# Patient Record
Sex: MALE | Race: WHITE | HISPANIC OR LATINO | Employment: OTHER | URBAN - METROPOLITAN AREA
[De-identification: names, ages, dates, MRNs, and addresses within clinical notes are randomized per-mention and may not be internally consistent; named-entity substitution may affect disease eponyms.]

---

## 2017-01-26 ENCOUNTER — TELEPHONE (OUTPATIENT)
Dept: GASTROENTEROLOGY | Facility: OUTPATIENT CENTER | Age: 42
End: 2017-01-26

## 2017-01-26 DIAGNOSIS — K62.5 RECTAL BLEEDING: Primary | ICD-10-CM

## 2017-01-26 NOTE — TELEPHONE ENCOUNTER
Patient taking any blood thinners ? no    Heart disease ? denies    Lung disease ? Mild asthma. Advised pt. To bring inhaler      Sleep apnea ? denies    Diabetic ? denies    Kidney disease ? denies    Dialysis ? n/a    Electronic implanted medical devices ? denies    Are you taking any narcotic pain medication ? no  What is your daily dosage ?    PTSD ? n/a    Prep instructions reviewed with patient ? Instructions,  MAC sedation plan reviewed.  policy reviewed and reinforced. Advised exam would not be done if pt. Did not have a . Also advised pt. To have someone stay with him post exam.    Ph[facility] : [facility]    Indication for procedure : rectal bleeding    Referring provider : Nakia Gabriel

## 2017-02-02 ENCOUNTER — TRANSFERRED RECORDS (OUTPATIENT)
Dept: HEALTH INFORMATION MANAGEMENT | Facility: CLINIC | Age: 42
End: 2017-02-02

## 2017-02-07 ENCOUNTER — OFFICE VISIT (OUTPATIENT)
Dept: INTERNAL MEDICINE | Facility: CLINIC | Age: 42
End: 2017-02-07

## 2017-02-07 VITALS
WEIGHT: 158 LBS | DIASTOLIC BLOOD PRESSURE: 87 MMHG | SYSTOLIC BLOOD PRESSURE: 137 MMHG | BODY MASS INDEX: 23.32 KG/M2 | HEART RATE: 86 BPM | RESPIRATION RATE: 16 BRPM

## 2017-02-07 DIAGNOSIS — M54.42 ACUTE LEFT-SIDED LOW BACK PAIN WITH LEFT-SIDED SCIATICA: Primary | ICD-10-CM

## 2017-02-07 DIAGNOSIS — Z11.3 SCREEN FOR STD (SEXUALLY TRANSMITTED DISEASE): ICD-10-CM

## 2017-02-07 LAB — HIV 1+2 AB+HIV1 P24 AG SERPL QL IA: NORMAL

## 2017-02-07 PROCEDURE — 87591 N.GONORRHOEAE DNA AMP PROB: CPT | Performed by: STUDENT IN AN ORGANIZED HEALTH CARE EDUCATION/TRAINING PROGRAM

## 2017-02-07 PROCEDURE — 87389 HIV-1 AG W/HIV-1&-2 AB AG IA: CPT | Performed by: STUDENT IN AN ORGANIZED HEALTH CARE EDUCATION/TRAINING PROGRAM

## 2017-02-07 PROCEDURE — 87491 CHLMYD TRACH DNA AMP PROBE: CPT | Performed by: STUDENT IN AN ORGANIZED HEALTH CARE EDUCATION/TRAINING PROGRAM

## 2017-02-07 RX ORDER — CYCLOBENZAPRINE HCL 5 MG
10 TABLET ORAL 3 TIMES DAILY PRN
Qty: 42 TABLET | Refills: 0 | Status: SHIPPED | OUTPATIENT
Start: 2017-02-07 | End: 2019-03-05

## 2017-02-07 ASSESSMENT — PAIN SCALES - GENERAL: PAINLEVEL: MILD PAIN (2)

## 2017-02-07 NOTE — PROGRESS NOTES
PRIMARY CARE CLINIC    Resident Clinic Note        Visit Date: February 7, 2017    César Conn  MRN: [id number]  YOB: 1975    HPI:  César Conn is a 41 year old male with a h/o GERD, asthma, hemorrhoids s/p banding, and EtOH abuse (sober since 2010) that presents for follow up about a recent colonoscopy.      The patient was last seen in 11/2015 and referred to colorectal surgery for hemorrhoids that failed conservative therapy.  He subsequently had banding and due to ongoing bleeding also had a colonoscopy that was unremarkable.  Since his last banding he has been doing well.      Acutely, 5-6 weeks ago he slipped on ice, falling onto his left buttock/flank.  Initially he manages with tylenol and stretching.  Symptoms have predominately been tightness and radicular symptoms radiating down the left leg to the knee.  Radicular symptoms have significantly improved and no longer occur.  Still having morning tightness.  No bowel or bladder incontinence.  No LE weakness.           ROS:  Reviewed and negative other than stated in HPI    Past medical history reviewed.    Past Medical History   Diagnosis Date     Asthma      GERD (gastroesophageal reflux disease)      History of ETOH abuse      Shoulder separation      bilateral ('99, '10)     Depression      Anxiety        No Known Allergies    Past Surgical History   Procedure Laterality Date     Shoulder separation repair Bilateral        Family History   Problem Relation Age of Onset     DIABETES Mother        Social History     Social History     Marital Status: Single     Spouse Name: N/A     Number of Children: N/A     Years of Education: N/A     Occupational History     Not on file.     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       Vitals:  /87 mmHg  Pulse 86  Resp 16  Wt 71.668 kg (158 lb)    Physical Exam:  General: NAD  HEENT: MMM, EOM intact  Resp: Non-labored  MSK: No vetebral column tenderness, mils bilaterally tenderness over the SI joints, full ROM or the low back  Extremities: WWP  Neuro: AAOx3, no lateralizing symptoms or focal neurologic deficits    A/P:  César was seen today for results.    Diagnoses and all orders for this visit:    Acute left-sided low back pain with left-sided sciatica  Mechanical muscular strain 2/2 to fall.  Improving overall.  No alarm symptoms.  Will continue conservative management.   -     cyclobenzaprine (FLEXERIL) 5 MG tablet; Take 2 tablets (10 mg) by mouth 3 times daily as needed for muscle spasms  -     PHYSICAL THERAPY REFERRAL    Screen for STD (sexually transmitted disease)  -     HIV Antigen Antibody Combo; Future  -     Neisseria gonorrhoeae PCR - Order printing and Urine specimen collection in lab  -     Chlamydia trachomatis PCR - Order printing and Urine specimen collection in lab    Hemorrhoids  Currently doing well.  Follows with colorectal surgery.     Health Maintenance: None  Follow-up: 1 year or PRN    Patient seen and discussed with Dr. Noel.      Asad Dhillon MD, MPH  PGY-3, Internal Medicine   [phone]       Pt was seen and examined with Dr. Dhillon.  I agree with his documentation as noted above.    My additional comments: None    Ever  William Noel

## 2017-02-07 NOTE — MR AVS SNAPSHOT
After Visit Summary   2/7/2017    César Conn    MRN: [id number]           Patient Information     Date Of Birth          1975        Visit Information        Provider Department      2/7/2017 1:15 PM César Dhillon MD [facility]        Today's Diagnoses     Acute left-sided low back pain with left-sided sciatica    -  1     Screen for STD (sexually transmitted disease)           Care Instructions    Primary Care Center Phone Number [phone]  Primary Care Center Medication Refill Request Information:  * Please contact your pharmacy regarding ANY request for medication refills.  ** [facility] Prescription Fax = [phone]  * Please allow 3 business days for routine medication refills.  * Please allow 5 business days for controlled substance medication refills.     Primary Care Center Test Result notification information:  *You will be notified with in 7-10 days of your appointment day regarding the results of your test.  If you are on MyChart you will be notified as soon as the provider has reviewed the results and signed off on them.        HOW TO QUIT SMOKING  Smoking is one of the hardest habits to break. About half of all those who have ever smoked have been able to quit, and most of those (about 70%) who still smoke want to quit. Here are some of the best ways to stop smoking.     KEEP TRYING:  It takes most smokers about 8 tries before they are finally able to fully quit. So, the more often you try and fail, the better your chance of quitting the next time! So, don't give up!    GO COLD TURKEY:  Most ex-smokers quit cold turkey. Trying to cut back gradually doesn't seem to work as well, perhaps because it continues the smoking habit. Also, it is possible to fool yourself by inhaling more while smoking fewer cigarettes. This results in the same amount of nicotine in your body!    GET SUPPORT:  Support programs can make an important difference, especially for the  heavy smoker. These groups offer lectures, methods to change your behavior and peer support. Call the free national Quitline for more information. 800-QUIT-NOW (196-817-2182). Low-cost or free programs are offered by many hospitals, local chapters of the American Lung Association (391-276-3789) and the American Cancer Society (169-408-4797). Support at home is important too. Non-smokers can help by offering praise and encouragement. If the smoker fails to quit, encourage them to try again!  OVER-THE-COUNTER MEDICINES:  For those who can't quit on their own, Nicotine Replacement Therapy (NRT) may make quitting much easier. Certain aids such as the nicotine patch, gum and lozenge are available without a prescription. However, it is best to use these under the guidance of your doctor. The skin patch provides a steady supply of nicotine to the body. Nicotine gum and lozenge gives temporary bursts of low levels of nicotine. Both methods take the edge off the craving for cigarettes. WARNING: If you feel symptoms of nicotine overdose, such as nausea, vomiting, dizziness, weakness, or fast heartbeat, stop using these and see your doctor.    PRESCRIPTION MEDICINES:  After evaluating your smoking patterns and prior attempts at quitting, your doctor may offer a prescription medicine such as bupropion (Zyban, Wellbutrin), varenicline (Chantix, Champix), a niocotine inhaler or nasal spray. Each has its unique advantage and side effects which your doctor can review with you.    HEALTH BENEFITS OF QUITTING:  The benefits of quitting start right away and keep improving the longer you go without smokin minutes: blood pressure and pulse return to normal    8 hours: oxygen levels return to normal    2 days: ability to smell and taste begins to improve as damaged nerves start to regrow    2-3 weeks: circulation and lung function improves    1-9 months: decreased cough, congestion and shortness of breath; less tired    1 year:  risk of heart attack decreases by half    5 years: risk of lung cancer decreases by half; risk of stroke becomes the same as a non-smoker  For information about how to quit smoking, visit the following links:    National Cancer [place] ,   Clearing the Air, Quit Smoking Today   - an online booklet. http://www.smokefree.gov/pubs/clearing_the_air.pdf    Smokefree.gov http://smokefree.gov/    QuitNet http://www.quitnet.com/    9338-1104 Momo Maldonado, [address] [place] [address], [place] [address]. All rights reserved. This information is not intended as a substitute for professional medical care. Always follow your healthcare professional's instructions.     Flexeril for muscle relaxation  Physical therapy  Tylenol for pain (up to 3g per day)  NSAIDs        Follow-ups after your visit        Additional Services     PHYSICAL THERAPY REFERRAL       *This therapy referral will be filtered to a centralized scheduling office at [facility] and the patient will receive a call to schedule an appointment at a [facility] location most convenient for them. *     [facility] provides Physical Therapy evaluation and treatment and many specialty services across the [facility] system.  If requesting a specialty program, please choose from the list below.    If you have not heard from the scheduling office within 2 business days, please call [phone] for all locations, with the exception of [place], please call [phone].  Treatment: Evaluation & Treatment  Special Instructions/Modalities:   Special Programs: None    Please be aware that coverage of these services is subject to the terms and limitations of your health insurance plan.  Call member services at your health plan with any benefit or coverage questions.      **Note to Provider:  If you are referring outside of [facility] for the therapy appointment, please list the name of the location in the  special instructions  above, print the  referral and give to the patient to schedule the appointment.                  Future tests that were ordered for you today     Open Future Orders        Priority Expected Expires Ordered    HIV Antigen Antibody Combo Routine  2018            Who to contact     Please call your clinic at [phone] to:    Ask questions about your health    Make or cancel appointments    Discuss your medicines    Learn about your test results    Speak to your doctor   If you have compliments or concerns about an experience at your clinic, or if you wish to file a complaint, please contact [facility] Patient Relations at [phone] or email us at [email or web address]         Additional Information About Your Visit        [employer] website Information     Connequityt is an electronic gateway that provides easy, online access to your medical records. With LogoGrab, you can request a clinic appointment, read your test results, renew a prescription or communicate with your care team.     To sign up for LogoGrab visit the website at [email or web address]   You will be asked to enter the access code listed below, as well as some personal information. Please follow the directions to create your username and password.     Your access code is: [email or web address]  Expires: 2017  6:30 AM     Your access code will  in 90 days. If you need help or a new code, please contact your [facility] or call [phone] for assistance.        Care EveryWhere ID     This is your Care EveryWhere ID. This could be used by other organizations to access your [facility] medical records  [id number]        Your Vitals Were     Pulse Respirations                86 16           Blood Pressure from Last 3 Encounters:   17 137/87   16 146/94   16 125/83    Weight from Last 3 Encounters:   17 71.668 kg (158 lb)   16 73.619 kg (162 lb 4.8 oz)   12/02/15 75.252  kg (165 lb 14.4 oz)              We Performed the Following     Chlamydia trachomatis PCR - Order printing and Urine specimen collection in lab     Neisseria gonorrhoeae PCR - Order printing and Urine specimen collection in lab     PHYSICAL THERAPY REFERRAL          Today's Medication Changes          These changes are accurate as of: 2/7/17  1:48 PM.  If you have any questions, ask your nurse or doctor.               Start taking these medicines.        Dose/Directions    cyclobenzaprine 5 MG tablet   Commonly known as:  FLEXERIL   Used for:  Acute left-sided low back pain with left-sided sciatica        Dose:  10 mg   Take 2 tablets (10 mg) by mouth 3 times daily as needed for muscle spasms   Quantity:  42 tablet   Refills:  0            Where to get your medicines      These medications were sent to [facility] [address] [facility] [address]  [address] [place] [address] [address] [address]    Hours:  TRANSPLANT PHONE NUMBER [phone] Phone:  [phone]    - cyclobenzaprine 5 MG tablet             Primary Care Provider Office Phone # Fax #    César Dhillon [phone]       [address] [address]        Thank you!     Thank you for choosing [facility]  for your care. Our goal is always to provide you with excellent care. Hearing back from our patients is one way we can continue to improve our services. Please take a few minutes to complete the written survey that you may receive in the mail after your visit with us. Thank you!             Your Updated Medication List - Protect others around you: Learn how to safely use, store and throw away your medicines at www.disposemymeds.org.          This list is accurate as of: 2/7/17  1:48 PM.  Always use your most recent med list.                   Brand Name Dispense Instructions for use    albuterol 108 (90 BASE) MCG/ACT Inhaler    PROAIR  HFA/PROVENTIL HFA/VENTOLIN HFA    1 Inhaler    Inhale 2 puffs into the lungs every 6 hours as needed for shortness of breath / dyspnea or wheezing       cyclobenzaprine 5 MG tablet    FLEXERIL    42 tablet    Take 2 tablets (10 mg) by mouth 3 times daily as needed for muscle spasms       Multi-vitamin Tabs tablet      Take 1 tablet by mouth daily       XANAX PO      Take 0.25 mg by mouth

## 2017-02-07 NOTE — NURSING NOTE
Chief Complaint   Patient presents with     Results     pt is here for a follow up after colonoscopy        Violette Pyle CMA at 1:18 PM on 2/7/2017

## 2017-02-07 NOTE — PATIENT INSTRUCTIONS
Primary Care Center Phone Number [phone]  Primary Care Center Medication Refill Request Information:  * Please contact your pharmacy regarding ANY request for medication refills.  ** [facility] Prescription Fax = [phone]  * Please allow 3 business days for routine medication refills.  * Please allow 5 business days for controlled substance medication refills.     Primary Care Center Test Result notification information:  *You will be notified with in 7-10 days of your appointment day regarding the results of your test.  If you are on MyChart you will be notified as soon as the provider has reviewed the results and signed off on them.        HOW TO QUIT SMOKING  Smoking is one of the hardest habits to break. About half of all those who have ever smoked have been able to quit, and most of those (about 70%) who still smoke want to quit. Here are some of the best ways to stop smoking.     KEEP TRYING:  It takes most smokers about 8 tries before they are finally able to fully quit. So, the more often you try and fail, the better your chance of quitting the next time! So, don't give up!    GO COLD TURKEY:  Most ex-smokers quit cold turkey. Trying to cut back gradually doesn't seem to work as well, perhaps because it continues the smoking habit. Also, it is possible to fool yourself by inhaling more while smoking fewer cigarettes. This results in the same amount of nicotine in your body!    GET SUPPORT:  Support programs can make an important difference, especially for the heavy smoker. These groups offer lectures, methods to change your behavior and peer support. Call the free national Quitline for more information. 800-QUIT-NOW (575-633-3160). Low-cost or free programs are offered by many hospitals, local chapters of the American Lung Association (506-938-5915) and the American Cancer Society (778-477-5453). Support at home is important too. Non-smokers can help by offering praise and encouragement. If the smoker  fails to quit, encourage them to try again!  OVER-THE-COUNTER MEDICINES:  For those who can't quit on their own, Nicotine Replacement Therapy (NRT) may make quitting much easier. Certain aids such as the nicotine patch, gum and lozenge are available without a prescription. However, it is best to use these under the guidance of your doctor. The skin patch provides a steady supply of nicotine to the body. Nicotine gum and lozenge gives temporary bursts of low levels of nicotine. Both methods take the edge off the craving for cigarettes. WARNING: If you feel symptoms of nicotine overdose, such as nausea, vomiting, dizziness, weakness, or fast heartbeat, stop using these and see your doctor.    PRESCRIPTION MEDICINES:  After evaluating your smoking patterns and prior attempts at quitting, your doctor may offer a prescription medicine such as bupropion (Zyban, Wellbutrin), varenicline (Chantix, Champix), a niocotine inhaler or nasal spray. Each has its unique advantage and side effects which your doctor can review with you.    HEALTH BENEFITS OF QUITTING:  The benefits of quitting start right away and keep improving the longer you go without smokin minutes: blood pressure and pulse return to normal    8 hours: oxygen levels return to normal    2 days: ability to smell and taste begins to improve as damaged nerves start to regrow    2-3 weeks: circulation and lung function improves    1-9 months: decreased cough, congestion and shortness of breath; less tired    1 year: risk of heart attack decreases by half    5 years: risk of lung cancer decreases by half; risk of stroke becomes the same as a non-smoker  For information about how to quit smoking, visit the following links:    National Cancer [place] ,   Clearing the Air, Quit Smoking Today   - an online booklet. http://www.smokefree.gov/pubs/clearing_the_air.pdf    Smokefree.gov http://smokefree.gov/    QuitNet http://www.quitnet.com/    1467-3283 Momo  Erica, [address] [place] [address], [place] [address]. All rights reserved. This information is not intended as a substitute for professional medical care. Always follow your healthcare professional's instructions.     Flexeril for muscle relaxation  Physical therapy  Tylenol for pain (up to 3g per day)  NSAIDs

## 2017-02-08 LAB
C TRACH DNA SPEC QL NAA+PROBE: NORMAL
N GONORRHOEA DNA SPEC QL NAA+PROBE: NORMAL
SPECIMEN SOURCE: NORMAL
SPECIMEN SOURCE: NORMAL

## 2017-04-11 DIAGNOSIS — L65.9 HAIR LOSS: Primary | ICD-10-CM

## 2017-04-11 RX ORDER — FINASTERIDE 1 MG/1
1 TABLET, FILM COATED ORAL DAILY
Qty: 30 TABLET | Refills: 3 | Status: SHIPPED | OUTPATIENT
Start: 2017-04-11 | End: 2017-04-14

## 2017-04-11 NOTE — TELEPHONE ENCOUNTER
Dr. Dhillon    Pt is wondering if he could have Rx of  Propecia for his hair loss. Rx is pended for your review. Please sign if you agree to prescribe. Thanks.    Nat

## 2017-04-14 RX ORDER — FINASTERIDE 1 MG/1
1 TABLET, FILM COATED ORAL DAILY
Qty: 30 TABLET | Refills: 3 | Status: SHIPPED | OUTPATIENT
Start: 2017-04-14 | End: 2019-03-05

## 2017-04-14 NOTE — TELEPHONE ENCOUNTER
Propecia was sent to [facility] by Dr. Dhillon.  Resent to [facility] per pt's request.

## 2017-09-07 ENCOUNTER — OFFICE VISIT (OUTPATIENT)
Dept: INTERNAL MEDICINE | Facility: CLINIC | Age: 42
End: 2017-09-07

## 2017-09-07 VITALS — SYSTOLIC BLOOD PRESSURE: 134 MMHG | DIASTOLIC BLOOD PRESSURE: 79 MMHG | HEART RATE: 65 BPM | RESPIRATION RATE: 16 BRPM
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
## 2017-09-07 ASSESSMENT — ENCOUNTER SYMPTOMS
SHORTNESS OF BREATH: 0
COUGH: 0
ABDOMINAL PAIN: 0
HEMATURIA: 0
FEVER: 0
DIARRHEA: 0
TROUBLE SWALLOWING: 0
NECK PAIN: 1
EYE WATERING: 0
MYALGIAS: 1
CONSTIPATION: 0
STIFFNESS: 1
TINGLING: 1
SNORES LOUDLY: 0
SORE THROAT: 0
NAUSEA: 0
CHILLS: 0
DOUBLE VISION: 0
EXTREMITY NUMBNESS: 1
RECTAL BLEEDING: 0
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
## 2017-09-07 NOTE — PATIENT INSTRUCTIONS
Nice to meet you!  Please obtain Xrays of your shoulders and neck to rule out fracture.  Then, schedule an appointment with Sports Medicine.   Sincerely,  Dr. Eugenie Rosario  (My clinics are on Thursdays, every other month)      Primary Care Center Phone Number [phone]  Primary [facility] Medication Refill Request Information:  * Please contact your pharmacy regarding ANY request for medication refills.  ** [facility] Prescription Fax = [phone]  * Please allow 3 business days for routine medication refills.  * Please allow 5 business days for controlled substance medication refills.     [facility] Test Result notification information:  *You will be notified with in 7-10 days of your appointment day regarding the results of your test.  If you are on MyChart you will be notified as soon as the provider has reviewed the results and signed off on them.

## 2017-09-07 NOTE — NURSING NOTE
Chief Complaint   Patient presents with     Establish Care     pt is here to re establish care with new PCP     Shoulder Pain     pt is here to discuss neck and shoulder pain x 3 weeks       Violette Pyle CMA at 3:18 PM on 9/7/2017

## 2017-09-12 ENCOUNTER — OFFICE VISIT (OUTPATIENT)
Dept: ORTHOPEDICS | Facility: CLINIC | Age: 42
End: 2017-09-12

## 2017-09-12 VITALS — BODY MASS INDEX: 24.44 KG/M2 | HEIGHT: 69 IN | WEIGHT: 165 LBS

## 2017-09-12 DIAGNOSIS — G25.89 SCAPULAR DYSKINESIS: ICD-10-CM

## 2017-09-12 DIAGNOSIS — M79.10 TRIGGER POINT: Primary | ICD-10-CM
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
## 2017-09-12 NOTE — MR AVS SNAPSHOT
After Visit Summary   2017    César Conn    MRN: [id number]           Patient Information     Date Of Birth          1975        Visit Information        Provider Department      2017 1:00 PM Orlando Ochoa MD [facility] Sports Medicine        Today's Diagnoses     Trigger point    -  1    Scapular dyskinesis           Follow-ups after your visit        Additional Services     PHYSICAL THERAPY REFERRAL (Internal)       Physical Therapy Referral                  Who to contact     Please call your clinic at [phone] to:    Ask questions about your health    Make or cancel appointments    Discuss your medicines    Learn about your test results    Speak to your doctor   If you have compliments or concerns about an experience at your clinic, or if you wish to file a complaint, please contact [facility] Patient Relations at [phone] or email us at [email or web address]         Additional Information About Your Visit        MyChart Information     ArtusLabs is an electronic gateway that provides easy, online access to your medical records. With ArtusLabs, you can request a clinic appointment, read your test results, renew a prescription or communicate with your care team.     To sign up for Investicaret visit the website at [email or web address]   You will be asked to enter the access code listed below, as well as some personal information. Please follow the directions to create your username and password.     Your access code is: [email or web address]  Expires: 2017  6:30 AM     Your access code will  in 90 days. If you need help or a new code, please contact your [facility] or call [phone] for assistance.        Care EveryWhere ID     This is your Care EveryWhere ID. This could be used by other organizations to access your [place] medical records  [id number]        Your Vitals Were     Height BMI  "(Body Mass Index)                5' 9\" (1.753 m) 24.37 kg/m2           Blood Pressure from Last 3 Encounters:   09/07/17 134/79   02/07/17 137/87   11/07/16 (!) 146/94    Weight from Last 3 Encounters:   09/12/17 165 lb (74.8 kg)   02/07/17 158 lb (71.7 kg)   01/13/16 162 lb 4.8 oz (73.6 kg)              We Performed the Following     PHYSICAL THERAPY REFERRAL (Internal)        Primary Care Provider Office Phone # Fax #    Eugenie Rosario [phone]       [address]  [facility] [address]        Equal Access to Services     ANDRÉS CM : Hadii fernando cagleo Soscooter, waaxda luqadaha, qaybta kaalmada adeegyada, waxay sarahin kelly dee. So [facility] [phone].    ATENCIÓN: Si habla español, tiene a garvey disposición servicios gratuitos de asistencia lingüística. [facility] [phone].    We comply with applicable federal civil rights laws and [place] laws. We do not discriminate on the basis of race, color, national origin, age, disability sex, sexual orientation or gender identity.            Thank you!     Thank you for choosing [facility]  for your care. Our goal is always to provide you with excellent care. Hearing back from our patients is one way we can continue to improve our services. Please take a few minutes to complete the written survey that you may receive in the mail after your visit with us. Thank you!             Your Updated Medication List - Protect others around you: Learn how to safely use, store and throw away your medicines at www.disposemymeds.org.          This list is accurate as of: 9/12/17  1:31 PM.  Always use your most recent med list.                   Brand Name Dispense Instructions for use Diagnosis    albuterol 108 (90 BASE) MCG/ACT Inhaler    PROAIR HFA/PROVENTIL HFA/VENTOLIN HFA    1 Inhaler    Inhale 2 puffs into the lungs every 6 hours as needed for shortness of breath / dyspnea or wheezing    Mild " intermittent asthma       cyclobenzaprine 5 MG tablet    FLEXERIL    42 tablet    Take 2 tablets (10 mg) by mouth 3 times daily as needed for muscle spasms    Acute left-sided low back pain with left-sided sciatica       finasteride 1 MG tablet    PROPECIA    30 tablet    Take 1 tablet (1 mg) by mouth daily    Hair loss       Multi-vitamin Tabs tablet      Take 1 tablet by mouth daily        XANAX PO      Take 0.25 mg by mouth

## 2017-09-18 ENCOUNTER — THERAPY VISIT (OUTPATIENT)
Dept: PHYSICAL THERAPY | Facility: CLINIC | Age: 42
End: 2017-09-18
Payer: COMMERCIAL

## 2017-09-18 DIAGNOSIS — G25.89 SCAPULAR DYSKINESIS: Primary | ICD-10-CM

## 2017-09-18 PROCEDURE — 97112 NEUROMUSCULAR REEDUCATION: CPT | Mod: GP

## 2017-09-18 PROCEDURE — 97110 THERAPEUTIC EXERCISES: CPT | Mod: GP

## 2017-09-18 PROCEDURE — 97161 PT EVAL LOW COMPLEX 20 MIN: CPT | Mod: GP
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
## 2017-09-18 NOTE — MR AVS SNAPSHOT
"              After Visit Summary   9/18/2017    César Conn    MRN: [id number]           Patient Information     Date Of Birth          1975        Visit Information        Provider Department      9/18/2017 6:10 PM Saleem Lagunas PT [facility]        Today's Diagnoses     Scapular dyskinesis    -  1       Follow-ups after your visit        Your next 10 appointments already scheduled     Sep 27, 2017  2:50 PM CDT   SUDHA Extremity with Saleem Lagunas PT   [facility] ([facility] [place])    [address] [place] [address]  [address]  [place] [address]   [phone]            Oct 03, 2017  1:00 PM CDT   SUDHA Extremity with Saleem Lagunas PT   [facility] ([facility] [place])    [address] [place] [address]  [address]  [place] [address]   [phone]              Who to contact     If you have questions or need follow up information about today's clinic visit or your schedule please contact [facility] directly at [phone].  Normal or non-critical lab and imaging results will be communicated to you by MyChart, letter or phone within 4 business days after the clinic has received the results. If you do not hear from us within 7 days, please contact the clinic through [employer] or phone. If you have a critical or abnormal lab result, we will notify you by phone as soon as possible.  Submit refill requests through [employer] or call your pharmacy and they will forward the refill request to us. Please allow 3 business days for your refill to be completed.          Additional Information About Your Visit        [employer] Information     [employer] lets you send messages to your doctor, view your test results, renew your prescriptions, schedule appointments and more. To sign up, go to [email or web address] . Click on \"Log in\" on the left " "side of the screen, which will take you to the Welcome page. Then click on \"Sign up Now\" on the right side of the page.     You will be asked to enter the access code listed below, as well as some personal information. Please follow the directions to create your username and password.     Your access code is: [email or web address]  Expires: 2017  6:30 AM     Your access code will  in 90 days. If you need help or a new code, please call your [facility] clinic or [phone].        Care EveryWhere ID     This is your Care EveryWhere ID. This could be used by other organizations to access your [facility] medical records  [id number]         Blood Pressure from Last 3 Encounters:   17 134/79   17 137/87   16 (!) 146/94    Weight from Last 3 Encounters:   17 74.8 kg (165 lb)   17 71.7 kg (158 lb)   16 73.6 kg (162 lb 4.8 oz)              We Performed the Following     HC PT EVAL, LOW COMPLEXITY     NEUROMUSCULAR RE-EDUCATION     THERAPEUTIC EXERCISES        Primary Care Provider Office Phone # Fax #    Eugenie Rosario [phone]       [address] [address]        Equal Access to Services     ANDRÉS CM : Hadii fernando ku hadasho Soomaali, waaxda luqadaha, qaybta kaalmada adeegyada, riley dee. So [facility] [phone].    ATENCIÓN: Si habla español, tiene a garvey disposición servicios gratuitos de asistencia lingüística. Llame al [phone].    We comply with applicable federal civil rights laws and [place] laws. We do not discriminate on the basis of race, color, national origin, age, disability sex, sexual orientation or gender identity.            Thank you!     Thank you for choosing [facility]  for your care. Our goal is always to provide you with excellent care. Hearing back from our patients is one way we can continue to improve our " services. Please take a few minutes to complete the written survey that you may receive in the mail after your visit with us. Thank you!             Your Updated Medication List - Protect others around you: Learn how to safely use, store and throw away your medicines at www.disposemymeds.org.          This list is accurate as of: 9/18/17  7:19 PM.  Always use your most recent med list.                   Brand Name Dispense Instructions for use Diagnosis    albuterol 108 (90 BASE) MCG/ACT Inhaler    PROAIR HFA/PROVENTIL HFA/VENTOLIN HFA    1 Inhaler    Inhale 2 puffs into the lungs every 6 hours as needed for shortness of breath / dyspnea or wheezing    Mild intermittent asthma       cyclobenzaprine 5 MG tablet    FLEXERIL    42 tablet    Take 2 tablets (10 mg) by mouth 3 times daily as needed for muscle spasms    Acute left-sided low back pain with left-sided sciatica       finasteride 1 MG tablet    PROPECIA    30 tablet    Take 1 tablet (1 mg) by mouth daily    Hair loss       Multi-vitamin Tabs tablet      Take 1 tablet by mouth daily        XANAX PO      Take 0.25 mg by mouth

## 2017-09-19 NOTE — PROGRESS NOTES
Subjective:    Patient is a 41 year old male presenting with rehab left ankle/foot hpi.                                          Other surgeries include:  Orthopedic surgery (Both shoulders).    Current occupation is Self employed, retail.    Primary job tasks include:  Prolonged sitting, operating a machine, prolonged standing, lifting, driving and repetitive tasks (Pushing/pulling, computer work).                                Objective:    System    Physical Exam    General     ROS    Assessment/Plan:

## 2017-09-27 ENCOUNTER — THERAPY VISIT (OUTPATIENT)
Dept: PHYSICAL THERAPY | Facility: CLINIC | Age: 42
End: 2017-09-27
Payer: COMMERCIAL

## 2017-09-27 DIAGNOSIS — G25.89 SCAPULAR DYSKINESIS: ICD-10-CM

## 2017-09-27 PROCEDURE — 97112 NEUROMUSCULAR REEDUCATION: CPT | Mod: GP

## 2017-09-27 PROCEDURE — 97110 THERAPEUTIC EXERCISES: CPT | Mod: GP

## 2017-09-27 PROCEDURE — 97140 MANUAL THERAPY 1/> REGIONS: CPT | Mod: GP

## 2017-10-11 ENCOUNTER — THERAPY VISIT (OUTPATIENT)
Dept: PHYSICAL THERAPY | Facility: CLINIC | Age: 42
End: 2017-10-11
Payer: COMMERCIAL

## 2017-10-11 DIAGNOSIS — G25.89 SCAPULAR DYSKINESIS: ICD-10-CM

## 2017-10-11 PROCEDURE — 97140 MANUAL THERAPY 1/> REGIONS: CPT | Mod: GP

## 2017-10-11 PROCEDURE — 97112 NEUROMUSCULAR REEDUCATION: CPT | Mod: GP

## 2017-10-11 PROCEDURE — 97110 THERAPEUTIC EXERCISES: CPT | Mod: GP

## 2017-10-25 ENCOUNTER — THERAPY VISIT (OUTPATIENT)
Dept: PHYSICAL THERAPY | Facility: CLINIC | Age: 42
End: 2017-10-25
Payer: COMMERCIAL

## 2017-10-25 DIAGNOSIS — G25.89 SCAPULAR DYSKINESIS: ICD-10-CM

## 2017-10-25 PROCEDURE — 97140 MANUAL THERAPY 1/> REGIONS: CPT | Mod: GP

## 2017-10-25 PROCEDURE — 97112 NEUROMUSCULAR REEDUCATION: CPT | Mod: GP

## 2017-10-25 PROCEDURE — 97110 THERAPEUTIC EXERCISES: CPT | Mod: GP

## 2017-11-08 ENCOUNTER — THERAPY VISIT (OUTPATIENT)
Dept: PHYSICAL THERAPY | Facility: CLINIC | Age: 42
End: 2017-11-08
Payer: COMMERCIAL

## 2017-11-08 DIAGNOSIS — G25.89 SCAPULAR DYSKINESIS: ICD-10-CM

## 2017-11-08 PROCEDURE — 97110 THERAPEUTIC EXERCISES: CPT | Mod: GP

## 2017-11-08 PROCEDURE — 97140 MANUAL THERAPY 1/> REGIONS: CPT | Mod: GP

## 2017-11-08 PROCEDURE — 97112 NEUROMUSCULAR REEDUCATION: CPT | Mod: GP

## 2017-11-21 ENCOUNTER — THERAPY VISIT (OUTPATIENT)
Dept: PHYSICAL THERAPY | Facility: CLINIC | Age: 42
End: 2017-11-21
Payer: COMMERCIAL

## 2017-11-21 DIAGNOSIS — G25.89 SCAPULAR DYSKINESIS: ICD-10-CM

## 2017-11-21 PROCEDURE — 97112 NEUROMUSCULAR REEDUCATION: CPT | Mod: GP

## 2017-11-21 PROCEDURE — 97110 THERAPEUTIC EXERCISES: CPT | Mod: GP

## 2018-05-31 ENCOUNTER — TELEPHONE (OUTPATIENT)
Dept: SURGERY | Facility: CLINIC | Age: 43
End: 2018-05-31

## 2018-05-31 NOTE — TELEPHONE ENCOUNTER
The patient was called in regard to his below message. The patient was informed that since he has not been seen in clinic in over 1 year we would not be able to prescribe him these suppositories unless he has been assessed. Patient was given the phone number to call and schedule an appointment at [phone] option 1 to schedule. Patient stated understanding of these recommendations and is in agreement with making this appointment. Patient was also notified that Nakia is booked approximately 1-2 weeks out and patient was ok with this time frame.

## 2018-05-31 NOTE — TELEPHONE ENCOUNTER
Health Call Center    Phone Message    May a detailed message be left on voicemail: yes    Reason for Call: Other: Patient is calling to find out about the suppository that he was offered and declined, he thinks now be a good time to accept.  Please call to follow up.      Action Taken: Message routed to:  [facility] Rect[facility]

## 2018-08-16 ENCOUNTER — TELEPHONE (OUTPATIENT)
Dept: SURGERY | Facility: CLINIC | Age: 43
End: 2018-08-16

## 2018-08-16 NOTE — TELEPHONE ENCOUNTER
Left message for patient reminding him of appt on 8/17/18 @ 11:30am with Nakia Chao NP. Pt to call if he needs to cancel/reschedule appt.    Kirsten PATEL LPN

## 2018-08-17 ENCOUNTER — OFFICE VISIT (OUTPATIENT)
Dept: SURGERY | Facility: CLINIC | Age: 43
End: 2018-08-17
Payer: COMMERCIAL

## 2018-08-17 VITALS
BODY MASS INDEX: 27.16 KG/M2 | WEIGHT: 183.4 LBS | HEART RATE: 82 BPM | DIASTOLIC BLOOD PRESSURE: 105 MMHG | OXYGEN SATURATION: 99 % | TEMPERATURE: 97.9 F | HEIGHT: 69 IN | SYSTOLIC BLOOD PRESSURE: 142 MMHG

## 2018-08-17 DIAGNOSIS — K60.30 ANAL FISTULA: Primary | ICD-10-CM

## 2018-08-17 DIAGNOSIS — K64.8 INTERNAL HEMORRHOIDS: ICD-10-CM

## 2018-08-17 ASSESSMENT — PAIN SCALES - GENERAL: PAINLEVEL: MODERATE PAIN (5)

## 2018-08-17 NOTE — MR AVS SNAPSHOT
"              After Visit Summary   2018    César Conn    MRN: [id number]           Patient Information     Date Of Birth          1975        Visit Information        Provider Department      2018 11:30 AM Nakia Gabriel APRN CNP [facility] Rectal Surgery        Today's Diagnoses     Anal fistula    -  1    Internal hemorrhoids           Follow-ups after your visit        Who to contact     Please call your clinic at [phone] to:    Ask questions about your health    Make or cancel appointments    Discuss your medicines    Learn about your test results    Speak to your doctor            Additional Information About Your Visit        MyChart Information     [employer] is an electronic gateway that provides easy, online access to your medical records. With [employer], you can request a clinic appointment, read your test results, renew a prescription or communicate with your care team.     To sign up for [employer] visit the website at [email or web address]   You will be asked to enter the access code listed below, as well as some personal information. Please follow the directions to create your username and password.     Your access code is: [email or web address]  Expires: 2018  6:30 AM     Your access code will  in 90 days. If you need help or a new code, please contact your [facility] or call [phone] for assistance.        Care EveryWhere ID     This is your Care EveryWhere ID. This could be used by other organizations to access your [facility] medical records  [id number]        Your Vitals Were     Pulse Temperature Height Pulse Oximetry BMI (Body Mass Index)       82 97.9  F (36.6  C) 5' 9\" 99% 27.08 kg/m2        Blood Pressure from Last 3 Encounters:   18 (!) 142/105   17 134/79   17 137/87    Weight from Last 3 Encounters:   18 183 lb 6.4 oz   17 165 lb   17 158 lb              We Performed " the Following     ANOSCOPY W/WO BRUSH/WASH        Primary Care Provider Office Phone # Fax #    Eugenie Rosario [phone]       [address]  [facility] [address]        Equal Access to Services     ANDRÉS CM : Hadii aad ku hadmanuelo Sokatiaali, waaxda luqadaha, qaybta kaalmada adeegyada, waxay idiin hayaan ademanoj khdanutafanny dee. So [facility] [phone].    ATENCIÓN: Si habla español, tiene a garvey disposición servicios gratuitos de asistencia lingüística. Greg al [phone].    We comply with applicable federal civil rights laws and [place] laws. We do not discriminate on the basis of race, color, national origin, age, disability, sex, sexual orientation, or gender identity.            Thank you!     Thank you for choosing [facility] COLON AND RE[facility]AL [facility]GERY  for your care. Our goal is always to provide you with excellent care. Hearing back from our patients is one way we can continue to improve our services. Please take a few minutes to complete the written survey that you may receive in the mail after your visit with us. Thank you!             Your Updated Medication List - Protect others around you: Learn how to safely use, store and throw away your medicines at www.disposemymeds.org.          This list is accurate as of 8/17/18 12:28 PM.  Always use your most recent med list.                   Brand Name Dispense Instructions for use Diagnosis    albuterol 108 (90 Base) MCG/ACT inhaler    PROAIR HFA/PROVENTIL HFA/VENTOLIN HFA    1 Inhaler    Inhale 2 puffs into the lungs every 6 hours as needed for shortness of breath / dyspnea or wheezing    Mild intermittent asthma       cyclobenzaprine 5 MG tablet    FLEXERIL    42 tablet    Take 2 tablets (10 mg) by mouth 3 times daily as needed for muscle spasms    Acute left-sided low back pain with left-sided sciatica       finasteride 1 MG tablet    PROPECIA    30 tablet    Take 1 tablet (1 mg) by mouth daily    Hair loss        Multi-vitamin Tabs tablet      Take 1 tablet by mouth daily        XANAX PO      Take 0.25 mg by mouth

## 2018-08-17 NOTE — PROGRESS NOTES
Colon and Rectal Surgery Follow-Up Clinic Note    RE: César Conn  : 1975  GOPAL: 2018    César Conn is a very pleasant 42 year old male here for follow-up of hemorrhoids.    Interval history: I have seen César in the past for internal hemorrhoids.  He has had hemorrhoid banding previously and tolerated this well.  I last saw him in .  He reports that his symptoms never completely resolved but he was able to manage his hemorrhoids with over-the-counter creams and suppositories.  However, about 1 month ago he began having pain with bowel movements that lasts for a few hours after bowel movement.  This is associated with bright red blood with bowel movements only.  He has not had this type of pain previously.  His stools have been very soft with the use of stool softeners and a probiotic.  This occurred after he was on a two-week vacation.  He denies any fevers or chills.  He denies any tissue that prolapses out and needs to be manually reduced.  He had a normal colonoscopy on 2017.    Assessment/Plan: 42 year old male with anal fistula.  On exam he has an external fistula opening in the left posterior position just outside the anal verge.  This can be gently probed toward the rectum approximately 2-3 cm.  I cannot visualize an internal opening but he does have large internal hemorrhoids as well.  As he has been symptomatic with this, we discussed EUA with possible fistulotomy possible seton drain placement.  He also request that his hemorrhoids be managed at the same time if possible so we did discuss possible hemorrhoidectomy if this seems appropriate during his procedure.  General risks related to anorectal surgery were reviewed with the patient, including but not limited to infection, bleeding, pain, anal stenosis, fissure, fecal incontinence, hemorrhoids, and urinary retention.  He states understanding of these risks and wishes to proceed with surgical intervention.  Asked him to  notify the clinic in the meantime if he has any worsening symptoms. Patient's questions were answered to his stated satisfaction and he is in agreement with this plan.      Medical history:  Past Medical History:   Diagnosis Date     Anxiety      Asthma      Depression      GERD (gastroesophageal reflux disease)      History of ETOH abuse      Shoulder separation     bilateral ('99, '10)       Surgical history:  Past Surgical History:   Procedure Laterality Date     APPENDECTOMY  1995    open     HEMORRHOIDECTOMY BANDING  2016    Total of 3 times since 2017     shoulder separation repair Bilateral        Problem list:  There are no active problems to display for this patient.      Medications:  Current Outpatient Prescriptions   Medication Sig Dispense Refill     ALPRAZolam (XANAX PO) Take 0.25 mg by mouth       multivitamin, therapeutic with minerals (MULTI-VITAMIN) TABS Take 1 tablet by mouth daily       albuterol (PROAIR HFA, PROVENTIL HFA, VENTOLIN HFA) 108 (90 BASE) MCG/ACT inhaler Inhale 2 puffs into the lungs every 6 hours as needed for shortness of breath / dyspnea or wheezing (Patient not taking: Reported on 9/7/2017) 1 Inhaler 1     cyclobenzaprine (FLEXERIL) 5 MG tablet Take 2 tablets (10 mg) by mouth 3 times daily as needed for muscle spasms (Patient not taking: Reported on 9/7/2017) 42 tablet 0     finasteride (PROPECIA) 1 MG tablet Take 1 tablet (1 mg) by mouth daily (Patient not taking: Reported on 9/7/2017) 30 tablet 3       Allergies:  No Known Allergies    Family history:  Family History   Problem Relation Age of Onset     Diabetes Mother      Esophageal Cancer Father        Social history:  Social History   Substance Use Topics     Smoking status: Current Every Day Smoker     Packs/day: 0.30     Types: Cigarettes     Smokeless tobacco: Never Used     Alcohol use No      Comment: Former 750ml-1L liquor/day ([id number])     Marital status: single.    There are no exam notes on file for this visit.  "    Physical Examination: Exam was chaperoned by Len Barr, EMT   BP (!) 142/105  Pulse 82  Temp 97.9  F (36.6  C)  Ht 5' 9\"  Wt 183 lb 6.4 oz  SpO2 99%  BMI 27.08 kg/m2  General: alert, oriented, in no acute distress, sitting comfortably  HEENT: mucous membranes moist  Perianal external examination:  Small opening in the left posterior position just outside the anal verge. This can be probed approximately 3 cm toward the rectum. No underlying induration or fluctuance.    Digital rectal examination: Was performed.   Sphincter tone: Good.  Palpable lesions: No.  Prostate: Normal.  Other: None..    Anoscopy: Was performed.   Hemorrhoids: Yes. Grade 3 internal hemorrhoids  Lesions: Unable to identify internal fistula opening but large hemorrhoids present making exam limited    Total face to face time was 15 minutes, >50% counseling.    Nakia Chao, NP-C  Colon and Rectal Surgery  [facility]    "

## 2018-08-17 NOTE — LETTER
2018      RE: César Conn  [address] [place] [address]       Colon and Rectal Surgery Follow-Up Clinic Note    RE: César Conn  : 1975  GOPAL: 2018    César Conn is a very pleasant 42 year old male here for follow-up of hemorrhoids.    Interval history: I have seen César in the past for internal hemorrhoids.  He has had hemorrhoid banding previously and tolerated this well.  I last saw him in .  He reports that his symptoms never completely resolved but he was able to manage his hemorrhoids with over-the-counter creams and suppositories.  However, about 1 month ago he began having pain with bowel movements that lasts for a few hours after bowel movement.  This is associated with bright red blood with bowel movements only.  He has not had this type of pain previously.  His stools have been very soft with the use of stool softeners and a probiotic.  This occurred after he was on a two-week vacation.  He denies any fevers or chills.  He denies any tissue that prolapses out and needs to be manually reduced.  He had a normal colonoscopy on 2017.    Assessment/Plan: 42 year old male with anal fistula.  On exam he has an external fistula opening in the left posterior position just outside the anal verge.  This can be gently probed toward the rectum approximately 2-3 cm.  I cannot visualize an internal opening but he does have large internal hemorrhoids as well.  As he has been symptomatic with this, we discussed EUA with possible fistulotomy possible seton drain placement.  He also request that his hemorrhoids be managed at the same time if possible so we did discuss possible hemorrhoidectomy if this seems appropriate during his procedure.  General risks related to anorectal surgery were reviewed with the patient, including but not limited to infection, bleeding, pain, anal stenosis, fissure, fecal incontinence, hemorrhoids, and urinary retention.  He states understanding of  these risks and wishes to proceed with surgical intervention.  Asked him to notify the clinic in the meantime if he has any worsening symptoms. Patient's questions were answered to his stated satisfaction and he is in agreement with this plan.      Medical history:  Past Medical History:   Diagnosis Date     Anxiety      Asthma      Depression      GERD (gastroesophageal reflux disease)      History of ETOH abuse      Shoulder separation     bilateral ('99, '10)       Surgical history:  Past Surgical History:   Procedure Laterality Date     APPENDECTOMY  1995    open     HEMORRHOIDECTOMY BANDING  2016    Total of 3 times since 2017     shoulder separation repair Bilateral        Problem list:  There are no active problems to display for this patient.      Medications:  Current Outpatient Prescriptions   Medication Sig Dispense Refill     ALPRAZolam (XANAX PO) Take 0.25 mg by mouth       multivitamin, therapeutic with minerals (MULTI-VITAMIN) TABS Take 1 tablet by mouth daily       albuterol (PROAIR HFA, PROVENTIL HFA, VENTOLIN HFA) 108 (90 BASE) MCG/ACT inhaler Inhale 2 puffs into the lungs every 6 hours as needed for shortness of breath / dyspnea or wheezing (Patient not taking: Reported on 9/7/2017) 1 Inhaler 1     cyclobenzaprine (FLEXERIL) 5 MG tablet Take 2 tablets (10 mg) by mouth 3 times daily as needed for muscle spasms (Patient not taking: Reported on 9/7/2017) 42 tablet 0     finasteride (PROPECIA) 1 MG tablet Take 1 tablet (1 mg) by mouth daily (Patient not taking: Reported on 9/7/2017) 30 tablet 3       Allergies:  No Known Allergies    Family history:  Family History   Problem Relation Age of Onset     Diabetes Mother      Esophageal Cancer Father        Social history:  Social History   Substance Use Topics     Smoking status: Current Every Day Smoker     Packs/day: 0.30     Types: Cigarettes     Smokeless tobacco: Never Used     Alcohol use No      Comment: Former 750ml-1L liquor/day (4720-9712)  "    Marital status: single.    There are no exam notes on file for this visit.     Physical Examination: Exam was chaperoned by Len Barr, EMT   BP (!) 142/105  Pulse 82  Temp 97.9  F (36.6  C)  Ht 5' 9\"  Wt 183 lb 6.4 oz  SpO2 99%  BMI 27.08 kg/m2  General: alert, oriented, in no acute distress, sitting comfortably  HEENT: mucous membranes moist  Perianal external examination:  Small opening in the left posterior position just outside the anal verge. This can be probed approximately 3 cm toward the rectum. No underlying induration or fluctuance.    Digital rectal examination: Was performed.   Sphincter tone: Good.  Palpable lesions: No.  Prostate: Normal.  Other: None..    Anoscopy: Was performed.   Hemorrhoids: Yes. Grade 3 internal hemorrhoids  Lesions: Unable to identify internal fistula opening but large hemorrhoids present making exam limited    Total face to face time was 15 minutes, >50% counseling.    Nakia Davidson, NP-C  Colon and Rectal Surgery  [facility]      Nakia Davidson, APRN CNP      "

## 2018-08-20 DIAGNOSIS — K60.30 ANAL FISTULA: Primary | ICD-10-CM

## 2018-08-22 ENCOUNTER — TELEPHONE (OUTPATIENT)
Dept: SURGERY | Facility: CLINIC | Age: 43
End: 2018-08-22

## 2018-08-22 NOTE — TELEPHONE ENCOUNTER
Called patient to schedule OR procedure with Dr. Navarro.  Left message with direct scheduling number.

## 2018-08-27 NOTE — TELEPHONE ENCOUNTER
Patient left a message returning my call.  Called patient and left a message offering first available 10/19/18 with Dr. Navarro.  Provided my direct number to confirm.

## 2018-09-11 NOTE — TELEPHONE ENCOUNTER
Patient left a message returning my call.  Called and spoke with patient.  Patient declined 10/19/18 as he will be out of town.  Patient is scheduled for 11/9/18.      Patient is scheduled for surgery with Dr. Navarro      Date of Surgery: 11/9/18    [facility]  [address] [place] [id number]    Informed patient they will need an adult  Yes    Pre-op with surgeon (if applicable): N/A    H&P: Scheduled with local physician    Additional imaging/appointments: N/A    Surgery packet: will be sent via email listed in EPIC

## 2018-09-25 ENCOUNTER — TELEPHONE (OUTPATIENT)
Dept: SURGERY | Facility: CLINIC | Age: 43
End: 2018-09-25

## 2018-09-25 NOTE — TELEPHONE ENCOUNTER
Patient left a message stating he needs to reschedule his upcoming surgery 11/9/18 as he will be out of town.  Called patient and left a message with my direct number to reschedule.

## 2019-03-05 ENCOUNTER — OFFICE VISIT (OUTPATIENT)
Dept: INTERNAL MEDICINE | Facility: CLINIC | Age: 44
End: 2019-03-05
Payer: COMMERCIAL

## 2019-03-05 VITALS
OXYGEN SATURATION: 98 % | WEIGHT: 186.9 LBS | HEART RATE: 68 BPM | DIASTOLIC BLOOD PRESSURE: 92 MMHG | RESPIRATION RATE: 20 BRPM | SYSTOLIC BLOOD PRESSURE: 134 MMHG | BODY MASS INDEX: 27.6 KG/M2

## 2019-03-05 DIAGNOSIS — R79.89 ELEVATED LFTS: ICD-10-CM

## 2019-03-05 DIAGNOSIS — F10.10 EXCESSIVE DRINKING ALCOHOL: ICD-10-CM

## 2019-03-05 DIAGNOSIS — Z00.00 ROUTINE HISTORY AND PHYSICAL EXAMINATION OF ADULT: Primary | ICD-10-CM

## 2019-03-05 DIAGNOSIS — I10 BENIGN ESSENTIAL HYPERTENSION: ICD-10-CM

## 2019-03-05 DIAGNOSIS — R22.1 NECK NODULE: ICD-10-CM

## 2019-03-05 LAB
ALBUMIN SERPL-MCNC: 4.2 G/DL (ref 3.4–5)
ALP SERPL-CCNC: 100 U/L (ref 40–150)
ALT SERPL W P-5'-P-CCNC: 275 U/L (ref 0–70)
ANION GAP SERPL CALCULATED.3IONS-SCNC: 5 MMOL/L (ref 3–14)
AST SERPL W P-5'-P-CCNC: 169 U/L (ref 0–45)
BILIRUB SERPL-MCNC: 0.7 MG/DL (ref 0.2–1.3)
BUN SERPL-MCNC: 11 MG/DL (ref 7–30)
CALCIUM SERPL-MCNC: 9 MG/DL (ref 8.5–10.1)
CHLORIDE SERPL-SCNC: 102 MMOL/L (ref 94–109)
CHOLEST SERPL-MCNC: 260 MG/DL
CO2 SERPL-SCNC: 29 MMOL/L (ref 20–32)
CREAT SERPL-MCNC: 0.89 MG/DL (ref 0.66–1.25)
ERYTHROCYTE [DISTWIDTH] IN BLOOD BY AUTOMATED COUNT: 11.9 % (ref 10–15)
GFR SERPL CREATININE-BSD FRML MDRD: >90 ML/MIN/{1.73_M2}
GLUCOSE SERPL-MCNC: 86 MG/DL (ref 70–99)
HCT VFR BLD AUTO: 45.4 % (ref 40–53)
HDLC SERPL-MCNC: 72 MG/DL
HGB BLD-MCNC: 15.6 G/DL (ref 13.3–17.7)
LDLC SERPL CALC-MCNC: 157 MG/DL
MCH RBC QN AUTO: 32.7 PG (ref 26.5–33)
MCHC RBC AUTO-ENTMCNC: 34.4 G/DL (ref 31.5–36.5)
MCV RBC AUTO: 95 FL (ref 78–100)
NONHDLC SERPL-MCNC: 188 MG/DL
PLATELET # BLD AUTO: 181 10E9/L (ref 150–450)
POTASSIUM SERPL-SCNC: 4.4 MMOL/L (ref 3.4–5.3)
PROT SERPL-MCNC: 8 G/DL (ref 6.8–8.8)
RBC # BLD AUTO: 4.77 10E12/L (ref 4.4–5.9)
SODIUM SERPL-SCNC: 136 MMOL/L (ref 133–144)
TRIGL SERPL-MCNC: 153 MG/DL
TSH SERPL DL<=0.005 MIU/L-ACNC: 3.44 MU/L (ref 0.4–4)
WBC # BLD AUTO: 5.4 10E9/L (ref 4–11)

## 2019-03-05 RX ORDER — LOSARTAN POTASSIUM 50 MG/1
50 TABLET ORAL DAILY
Qty: 30 TABLET | Refills: 1 | Status: SHIPPED | OUTPATIENT
Start: 2019-03-05

## 2019-03-05 RX ORDER — ALPRAZOLAM 0.25 MG
0.25 TABLET ORAL PRN
COMMUNITY

## 2019-03-05 SDOH — HEALTH STABILITY: MENTAL HEALTH: HOW MANY STANDARD DRINKS CONTAINING ALCOHOL DO YOU HAVE ON A TYPICAL DAY?: 7 TO 9

## 2019-03-05 SDOH — HEALTH STABILITY: MENTAL HEALTH: HOW OFTEN DO YOU HAVE A DRINK CONTAINING ALCOHOL?: 4 OR MORE TIMES A WEEK

## 2019-03-05 SDOH — HEALTH STABILITY: MENTAL HEALTH: HOW OFTEN DO YOU HAVE 6 OR MORE DRINKS ON ONE OCCASION?: WEEKLY

## 2019-03-05 ASSESSMENT — PAIN SCALES - GENERAL: PAINLEVEL: NO PAIN (0)

## 2019-03-05 NOTE — PATIENT INSTRUCTIONS
[facility] Medication Refill Request Information:  * Please contact your pharmacy regarding ANY request for medication refills.  ** [facility] Prescription Fax = [phone]  * Please allow 3 business days for routine medication refills.  * Please allow 5 business days for controlled substance medication refills.     [facility] Test Result notification information:  *You will be notified with in 7-10 days of your appointment day regarding the results of your test.  If you are on MyChart you will be notified as soon as the provider has reviewed the results and signed off on them.    Primary Care Center [phone]   Imaging/Radiology  [phone]     Low-Salt Diet  This diet removes foods that are high in salt. It also limits the amount of salt you use when cooking. It is most often used for people with high blood pressure, edema (fluid retention), and kidney, liver, or heart disease.  Table salt contains the mineral sodium. Your body needs sodium to work normally. But too much sodium can make your health problems worse. Your healthcare provider is recommending a low-salt (also called low-sodium) diet for you. Your total daily allowance of salt is 1,500 to 2,300 milligrams (mg). It is less than 1 teaspoon of table salt. This means you can have only about 500 to 700 mg of sodium at each meal. People with certain health problems should limit salt intake to the lower end of the recommended range.    When you cook, don t add much salt. If you can cook without using salt, even better. Don t add salt to your food at the table.  When shopping, read food labels. Salt is often called sodium on the label. Choose foods that are salt-free, low salt, or very low salt. Note that foods with reduced salt may not lower your salt intake enough.    Beans, potatoes, and pasta  Ok: Dry beans, split peas, lentils, potatoes, rice, macaroni, pasta, spaghetti without added salt  Avoid: Potato chips, tortilla chips, and similar  products  Breads and cereals  Ok: Low-sodium breads, rolls, cereals, and cakes; low-salt crackers, matzo crackers  Avoid: Salted crackers, pretzels, popcorn, Gambian toast, pancakes, muffins  Dairy  Ok: Milk, chocolate milk, hot chocolate mix, low-salt cheeses, and yogurt  Avoid: Processed cheese and cheese spreads; Roquefort, Camembert, and cottage cheese; buttermilk, instant breakfast drink  Desserts  Ok: Ice cream, frozen yogurt, juice bars, gelatin, cookies and pies, sugar, honey, jelly, hard candy  Avoid: Most pies, cakes and cookies prepared or processed with salt; instant pudding  Drinks  Ok: Tea, coffee, fizzy (carbonated) drinks, juices  Avoid: Flavored coffees, electrolyte replacement drinks, sports drinks  Meats  Ok: All fresh meat, fish, poultry, low-salt tuna, eggs, egg substitute  Avoid: Smoked, pickled, brine-cured, or salted meats and fish. This includes valente, chipped beef, corned beef, hot dogs, deli meats, ham, kosher meats, salt pork, sausage, canned tuna, salted codfish, smoked salmon, herring, sardines, or anchovies.  Seasonings and spices  Ok: Most seasonings are okay. Good substitutes for salt include: fresh herb blends, hot sauce, lemon, garlic, santos, vinegar, dry mustard, parsley, cilantro, horseradish, tomato paste, regular margarine, mayonnaise, unsalted butter, cream cheese, vegetable oil, cream, low-salt salad dressing and gravy.  Avoid: Regular ketchup, relishes, pickles, soy sauce, teriyaki sauce, Worcestershire sauce, BBQ sauce, tartar sauce, meat tenderizer, chili sauce, regular gravy, regular salad dressing, salted butter  Soups  Ok: Low-salt soups and broths made with allowed foods  Avoid: Bouillon cubes, soups with smoked or salted meats, regular soup and broth  Vegetables  Ok: Most vegetables are okay; also low-salt tomato and vegetable juices  Avoid: Sauerkraut and other brine-soaked vegetables; pickles and other pickled vegetables; tomato juice, olives  Date Last Reviewed:  8/1/2016 2000-2018 [employer]. [address] [place] [address], [place] [address]. All rights reserved. This information is not intended as a substitute for professional medical care. Always follow your healthcare professional's instructions.           Taking Your Blood Pressure  Blood pressure is the force of blood against the artery wall as it moves from the heart through the blood vessels. You can take your own blood pressure reading using a digital monitor. Take your readings the same each time, using the same arm. Take readings as often as your healthcare provider instructs.  About blood pressure monitors  Blood pressure monitors are designed for certain ages and cases. You can find monitors for older adults, for pregnant women, and for children. Make sure the one you choose is the right one for your age and situation.  The American Heart Association recommends an automatic cuff monitor that fits on your upper arm (bicep). The cuff should fit your arm size. A cuff that s too large or too small will not give an accurate reading. Measure around your upper arm to find your size.  Monitors that attach to your finger or wrist are not as accurate as monitors for your upper arm.  Ask your healthcare provider for help in choosing a monitor. Bring your monitor to your next provider visit if you need help in using it the correct way.  The steps below are general instructions for using an automatic digital monitor.  Step 1. Relax      Take your blood pressure at the same time every day, such as in the morning or evening, or at the time your healthcare provider recommends.    Wait at least a half-hour after smoking, eating, or exercising. Don't drink coffee, tea, soda, or other caffeinated beverages before checking your blood pressure.    Sit comfortably at a table with both feet on the floor. Do not cross your legs or feet. Place the monitor near you.    Rest for a few minutes before you begin.  Step 2. Wrap the  cuff      Place your arm on the table, palm up. Your arm should be at the level of your heart. Wrap the cuff around your upper arm, just above your elbow. It s best done on bare skin, not over clothing. Most cuffs will indicate where the brachial artery (the blood vessel in the middle of the arm at the inner side of the elbow) should line up with the cuff. Look in your monitor's instruction booklet for an illustration. You can also bring your cuff to your healthcare provider and have them show you how to correctly place the cuff.  Step 3. Inflate the cuff      Push the button that starts the pump.    The cuff will tighten, then loosen.    The numbers will change. When they stop changing, your blood pressure reading will appear.    Take 2 or 3 readings one minute apart.  Step 4. Write down the results of each reading      Write down your blood pressure numbers for each reading. Note the date and time. Keep your results in one place, such as a notebook. Even if your monitor has a built-in memory, keep a hard copy of the readings.    Remove the cuff from your arm. Turn off the machine.    Bring your blood pressure records with your healthcare providers at each visit.    If you start a new blood pressure medicine, note the day you started the new medicine. Also note the day if you change the dose of your medicine. This information goes on your blood pressure recording sheet. This will help your healthcare provider monitor how well the medicine changes are working.    Ask your healthcare provider what numbers should prompt you to call him or her. Also ask what numbers should prompt you to get help right away.  Date Last Reviewed: 11/1/2016 2000-2018 [employer]. [address] [place] [address], [place] [address]. All rights reserved. This information is not intended as a substitute for professional medical care. Always follow your healthcare professional's instructions.
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
## 2019-03-06 LAB
HBV SURFACE AB SERPL IA-ACNC: 15.01 M[IU]/ML
HBV SURFACE AG SERPL QL IA: NONREACTIVE
HCV AB SERPL QL IA: NONREACTIVE

## 2019-03-12 ENCOUNTER — ANCILLARY PROCEDURE (OUTPATIENT)
Dept: ULTRASOUND IMAGING | Facility: CLINIC | Age: 44
End: 2019-03-12
Attending: INTERNAL MEDICINE
Payer: COMMERCIAL

## 2019-03-12 DIAGNOSIS — R79.89 ELEVATED LFTS: ICD-10-CM

## 2019-03-12 DIAGNOSIS — R22.1 NECK NODULE: ICD-10-CM

## 2019-03-13 LAB — HAV IGG SER QL IA: REACTIVE

## 2019-03-14 ENCOUNTER — TELEPHONE (OUTPATIENT)
Dept: BEHAVIORAL HEALTH | Facility: CLINIC | Age: 44
End: 2019-03-14

## 2019-03-14 NOTE — TELEPHONE ENCOUNTER
Writer made the following attempts to contact client regarding Dr. Rios's referral to see Balwinder Fink for excessive alcohol use:     3/14 left voice message 3/13 voice message left 3/11 requested a call back around 3:30     No further attempts to contact client will be made at this time unless otherwise advised.

## 2019-03-15 ENCOUNTER — TELEPHONE (OUTPATIENT)
Dept: INTERNAL MEDICINE | Facility: CLINIC | Age: 44
End: 2019-03-15

## 2019-03-15 NOTE — TELEPHONE ENCOUNTER
[facility] Call Center    Phone Message    May a detailed message be left on voicemail: yes    Reason for Call: Other: Pt is waiting for someone to call him to give him his results. Please call him if results are available.      Action Taken: Message routed to:  [facility] ([facility]): ABRAM

## 2019-03-15 NOTE — TELEPHONE ENCOUNTER
Spoke to patient to relay results. Patient states verbal understanding and will follow up if lump persists. Carol Schumacher LPN 3/15/2019 4:07 PM

## 2019-07-25 ENCOUNTER — OFFICE VISIT (OUTPATIENT)
Dept: INTERNAL MEDICINE | Facility: CLINIC | Age: 44
End: 2019-07-25
Payer: COMMERCIAL

## 2019-07-25 VITALS — RESPIRATION RATE: 16 BRPM | HEART RATE: 87 BPM | SYSTOLIC BLOOD PRESSURE: 132 MMHG | DIASTOLIC BLOOD PRESSURE: 94 MMHG

## 2019-07-25 DIAGNOSIS — F32.A DEPRESSION, UNSPECIFIED DEPRESSION TYPE: ICD-10-CM

## 2019-07-25 DIAGNOSIS — R74.8 ELEVATED LIVER ENZYMES: ICD-10-CM

## 2019-07-25 DIAGNOSIS — R03.0 ELEVATED BLOOD PRESSURE READING WITHOUT DIAGNOSIS OF HYPERTENSION: ICD-10-CM

## 2019-07-25 DIAGNOSIS — R09.81 CONGESTION OF PARANASAL SINUS: ICD-10-CM

## 2019-07-25 DIAGNOSIS — Z11.3 SCREEN FOR STD (SEXUALLY TRANSMITTED DISEASE): ICD-10-CM

## 2019-07-25 DIAGNOSIS — R59.1 HEAD AND NECK LYMPHADENOPATHY: ICD-10-CM

## 2019-07-25 DIAGNOSIS — R30.0 DYSURIA: Primary | ICD-10-CM

## 2019-07-25 DIAGNOSIS — F41.9 ANXIETY: ICD-10-CM

## 2019-07-25 DIAGNOSIS — R30.0 DYSURIA: ICD-10-CM

## 2019-07-25 LAB
ALBUMIN SERPL-MCNC: 3.8 G/DL (ref 3.4–5)
ALBUMIN UR-MCNC: 100 MG/DL
ALP SERPL-CCNC: 118 U/L (ref 40–150)
ALT SERPL W P-5'-P-CCNC: 274 U/L (ref 0–70)
APPEARANCE UR: ABNORMAL
AST SERPL W P-5'-P-CCNC: 232 U/L (ref 0–45)
BILIRUB DIRECT SERPL-MCNC: 0.3 MG/DL (ref 0–0.2)
BILIRUB SERPL-MCNC: 0.6 MG/DL (ref 0.2–1.3)
BILIRUB UR QL STRIP: NEGATIVE
COLOR UR AUTO: YELLOW
GLUCOSE UR STRIP-MCNC: NEGATIVE MG/DL
HGB UR QL STRIP: ABNORMAL
KETONES UR STRIP-MCNC: NEGATIVE MG/DL
LEUKOCYTE ESTERASE UR QL STRIP: ABNORMAL
MUCOUS THREADS #/AREA URNS LPF: PRESENT /LPF
NITRATE UR QL: NEGATIVE
PH UR STRIP: 6 PH (ref 5–7)
PROT SERPL-MCNC: 8.2 G/DL (ref 6.8–8.8)
RBC #/AREA URNS AUTO: 39 /HPF (ref 0–2)
SOURCE: ABNORMAL
SP GR UR STRIP: 1.02 (ref 1–1.03)
UROBILINOGEN UR STRIP-MCNC: 0 MG/DL (ref 0–2)
WBC #/AREA URNS AUTO: >182 /HPF (ref 0–5)
WBC CLUMPS #/AREA URNS HPF: PRESENT /HPF

## 2019-07-25 PROCEDURE — 87591 N.GONORRHOEAE DNA AMP PROB: CPT | Performed by: INTERNAL MEDICINE

## 2019-07-25 PROCEDURE — 87086 URINE CULTURE/COLONY COUNT: CPT | Performed by: INTERNAL MEDICINE

## 2019-07-25 PROCEDURE — 87389 HIV-1 AG W/HIV-1&-2 AB AG IA: CPT | Performed by: INTERNAL MEDICINE

## 2019-07-25 PROCEDURE — 86780 TREPONEMA PALLIDUM: CPT | Performed by: INTERNAL MEDICINE

## 2019-07-25 PROCEDURE — 87491 CHLMYD TRACH DNA AMP PROBE: CPT | Performed by: INTERNAL MEDICINE

## 2019-07-25 RX ORDER — ESCITALOPRAM OXALATE 10 MG/1
10 TABLET ORAL DAILY
Qty: 30 TABLET | Refills: 11 | Status: SHIPPED | OUTPATIENT
Start: 2019-07-25

## 2019-07-25 RX ORDER — HYDROXYZINE HYDROCHLORIDE 25 MG/1
25 TABLET, FILM COATED ORAL 3 TIMES DAILY PRN
Qty: 30 TABLET | Refills: 3 | Status: SHIPPED | OUTPATIENT
Start: 2019-07-25

## 2019-07-25 RX ORDER — FLUTICASONE PROPIONATE 50 MCG
2 SPRAY, SUSPENSION (ML) NASAL DAILY
Qty: 15.8 ML | Refills: 3 | Status: SHIPPED | OUTPATIENT
Start: 2019-07-25

## 2019-07-25 ASSESSMENT — PAIN SCALES - GENERAL: PAINLEVEL: SEVERE PAIN (7)

## 2019-07-25 ASSESSMENT — PATIENT HEALTH QUESTIONNAIRE - PHQ9
SUM OF ALL RESPONSES TO PHQ QUESTIONS 1-9: 18
SUM OF ALL RESPONSES TO PHQ QUESTIONS 1-9: 18

## 2019-07-25 NOTE — TELEPHONE ENCOUNTER
FUTURE VISIT INFORMATION      FUTURE VISIT INFORMATION:    Date: 7/30/19    Time: 1:30 pm    Location: [facility]  REFERRAL INFORMATION:    Referring provider:  Dr. Velia Jenkins/Dr. Natalie Pittman    Referring providers clinic:  [facility]    Reason for visit/diagnosis  Congestion of paranasal sinus    RECORDS REQUESTED FROM:       Clinic name Comments Records Status Imaging Status   [facility] Office Visit-7/25/19-Dr. Velia Jenkins [facility] Imaging [facility]   [facility] Surgery-2/11/13  Office Visit-2/4/13, 2/18/13 Care Everywhere    [facility] Emergency Department CT Facial Bones-2/4/13 Report in Care Everywhere PACS   [facility] ED ED Visit-2/4/13 Care Everywhere            Action    Action Taken 7/25/2019 -2:42 PM-Faxed request for imaging to [facility].  ROSA  7/29/2019 -3:07 PM-Received images from [facility]. Archived to PACS PB

## 2019-07-25 NOTE — PATIENT INSTRUCTIONS
Primary Care Center Phone Number [phone]  Primary Care Center Medication Refill Request Information:  * Please contact your pharmacy regarding ANY request for medication refills.  ** [facility] Prescription Fax = [phone]  * Please allow 3 business days for routine medication refills.  * Please allow 5 business days for controlled substance medication refills.     Primary Care Center Test Result notification information:  *You will be notified with in 7-10 days of your appointment day regarding the results of your test.  If you are on MyChart you will be notified as soon as the provider has reviewed the results and signed off on them.               [facility]         Internal Medicine Resident                   Continuity Clinic     Who We Are    Resident Continuity Clinic is a part of the [facility].  Resident physicians see patients independently and establish a relationship with them over the course of their three-year residency program.  As with the Primary Care Clinic, our Resident Continuity Clinic models a group practice.  If your doctor is not available, you will be able to see another resident physician.  At the end of a resident s training, patients will be transitioned to a new resident physician for ongoing care.     We treat patients with a wide array of medical needs from routine physicals, to acute illnesses, to diabetes and blood pressure management, to complex medical illness.  What is a Resident Physician?    Resident physicians hold medical degrees and are doctors. They are training to become specialists in Internal Medicine. They work under the supervision of board-certified faculty physicians.  Expectations for Your Care    We strive to provide accessible, quality care at all times.    In order to provide this care, it is best to see your primary care resident doctor consistently rather switching between providers.  In the event you do see another physician, you  should schedule a follow-up visit with your usual primary care doctor.    If you are transitioning your care from another clinic, it is helpful to have your records available for your doctor to review.    We do not prescribe controlled substances, such as ADD medications or narcotic pain medications, on your first visit.  We will review your health records and concerns prior to devising a treatment plan with you in order to provide the best care.      Clinic Services     Extended clinic hours; patient  to help navigate your visit;  parking; laboratory and imaging services with evening and weekend hours    Multiple medical and surgical specialties in one building    Complementary services, including Nutrition, Integrative Medicine, Pharmacy consultations, Mental and Behavioral Health, Sports Medicine and Physical Therapy    Thank You    We would like to thank you for choosing the [facility] Resident Continuity Clinic for your primary care. You are making a priceless contribution to the training of the next generation of health care practitioners.     Contact us at [phone] for appointments or questions.    Resident Clinic Hours are Tuesdays and Thursdays, 7:30am-5:00pm    Residents   Marc Humphrey MD  (Male)   Darin Pace MD   (Male)   Patricia Paredes MD  (Female)  Violette Diaz MD   (Female)   Anita Vázquez MD   (Female)    Landen Ga MD    (Female)   Gordo Mar MD  (Male)   Brandon Bustamante MD  (Male)    Tangela Lee MD  (Female)   Thea Salmon MD  (Female)   Velia Jenkins MD    (Female)   Cirilo Paulson MD  (Male)   Dm Hoffman MD  (Male)   Bethel Dixon MD  (Male)   HIRAL Bentley MD   (Male)   Terence Carbajal MD  (Male)    Misty Lozoya MD (Female)   Noah Rivero MD  (Male)   Chelle Cantrell MD  (Male)   Elba Lindsey MD  (Male)   Yessica Riggins MD    (Female)   Cheikh Lucio MD  (Female)     Supervising Physicians   Jamia Rios,  MD Cornelio Soto, MD Yazmin Rios, MD Prem Swenson, MD Ever Noel, MD Bridgett Salazar, MD Natalie Pittman, MD Luc Dhillon, MD Debbie De Paz MD

## 2019-07-25 NOTE — PROGRESS NOTES
PRIMARY CARE CENTER       SUBJECTIVE:  César Conn is a 43 year old male with a PMHx of depression, anxiety and alcohol abuse who comes in for STI testing.     Patient recently was driving through [place] when he started having car trouble. He stayed in [place] for a few days while waiting for his car to be fixed and engaged in sexual activity with a woman. He states that he used barrier protection during vaginal penetration,but not with oral sex Since this patient has noticed intense burning with urination and some white discharge from his penis. He denies sores or rashes in the genital area. Additionally, he has had some windy blood from the penis but no evidence of lesions.     Previously had US on throat and original mass on throat is larger than prior. He has pain when lying down and swallowing. Has been going on since last visit.     Endorses increased depression and anxiety and says that this has affected his appetite.He would like to re start and anti depressant to manage symptoms.         Medications and allergies reviewed by me today.     ROS:   Constitutional, HEENT, cardiovascular, pulmonary, gi and gu systems are negative, except as otherwise noted.    OBJECTIVE:    Resp 16    Wt Readings from Last 1 Encounters:   03/05/19 84.8 kg (186 lb 14.4 oz)       GENERAL APPEARANCE: healthy, alert and no distress     EYES: EOMI,  PERRL     HENT: ear canals and TM's normal, mucosal edema s right nare, and mouth without ulcers or lesions     RESP: lungs clear to auscultation - no rales, rhonchi or wheezes     CV: regular rates and rhythm, normal S1 S2, no S3 or S4 and no murmur, click or rub     ABDOMEN:  soft, nontender, no HSM or masses and bowel sounds normal     MS: extremities normal- no gross deformities noted, no evidence of inflammation in joints, FROM in all extremities.     SKIN: no suspicious lesions or rashes     NEURO: Normal strength and tone, sensory exam grossly normal, mentation  intact and speech normal     PSYCH: mentation appears normal. and affect normal     ASSESSMENT/PLAN:    César was seen today for STI screening    Diagnoses and all orders for this visit:    Dysuria  -     N. gonorrhea PCR - Urine, Lab Collect; Future  -     C. trachomatis PCR - Urine, Lab Collect; Future  -     UA with Micro reflex to Culture; Future    Screen for STD (sexually transmitted disease)  -     N. gonorrhea PCR - Urine, Lab Collect; Future  -     C. trachomatis PCR - Urine, Lab Collect; Future  -     Treponema Abs w Reflex to RPR and Titer; Future  -     HIV Antigen Antibody Combo; Future    Elevated liver enzymes  -     Hepatic panel; Future    Elevated blood pressure reading without diagnosis of hypertension  -     24 Hour Blood Pressure Monitor - Adult; Future    Congestion of paranasal sinus  -     fluticasone (FLONASE) 50 MCG/ACT nasal spray; Spray 2 sprays into both nostrils daily  -     OTOLARYNGOLOGY REFERRAL    Head and neck lymphadenopathy  -     US Head Neck Soft Tissue; Future    Anxiety  -     escitalopram (LEXAPRO) 10 MG tablet; Take 1 tablet (10 mg) by mouth daily  -     hydrOXYzine (ATARAX) 25 MG tablet; Take 1 tablet (25 mg) by mouth 3 times daily as needed for anxiety    Depression, unspecified depression type  -     escitalopram (LEXAPRO) 10 MG tablet; Take 1 tablet (10 mg) by mouth daily       Pt should return for follow up as needed.     Velia Jenkins MD  Internal Medicine, PGY2  Jul 25, 2019    Pt was seen and plan of care discussed with Dr. Pittman     I was present during the visit and the patient was seen and examined by me in conjunction with the resident.  I discussed the pertinent history, exam, results and plan with the resident and agree with the documentation above with the following exceptions: none.    Natalie Pittman MD

## 2019-07-26 ENCOUNTER — TELEPHONE (OUTPATIENT)
Dept: INTERNAL MEDICINE | Facility: CLINIC | Age: 44
End: 2019-07-26

## 2019-07-26 ENCOUNTER — TELEPHONE (OUTPATIENT)
Dept: OTOLARYNGOLOGY | Facility: CLINIC | Age: 44
End: 2019-07-26

## 2019-07-26 LAB
BACTERIA SPEC CULT: NO GROWTH
C TRACH DNA SPEC QL NAA+PROBE: NEGATIVE
HIV 1+2 AB+HIV1 P24 AG SERPL QL IA: NONREACTIVE
Lab: NORMAL
N GONORRHOEA DNA SPEC QL NAA+PROBE: POSITIVE
SPECIMEN SOURCE: ABNORMAL
SPECIMEN SOURCE: NORMAL
SPECIMEN SOURCE: NORMAL
T PALLIDUM AB SER QL: NONREACTIVE

## 2019-07-26 NOTE — TELEPHONE ENCOUNTER
[facility] Call Center    Phone Message    May a detailed message be left on voicemail: yes    Reason for Call: Requesting Results   Name/type of test: Patient calling for results from 7/25/19 tests  Date of test: 7/25/19  Was test done at a location other than [facility] (Please fill in the location if not [facility])?: No      Action Taken: Message routed to:  [facility] ([facility]): [facility]

## 2019-07-26 NOTE — TELEPHONE ENCOUNTER
[facility] Call Center    Phone Message    May a detailed message be left on voicemail: yes    Reason for Call: Requesting Results   Name/type of test: Labs  Date of test: 7/25/19  Was test done at a location other than [facility] (Please fill in the location if not [facility])?: No      Action Taken: Message routed to:  [facility] ([facility]): [facility]

## 2019-07-27 ENCOUNTER — NURSE TRIAGE (OUTPATIENT)
Dept: NURSING | Facility: CLINIC | Age: 44
End: 2019-07-27

## 2019-07-27 ENCOUNTER — TELEPHONE (OUTPATIENT)
Dept: PEDIATRICS | Facility: CLINIC | Age: 44
End: 2019-07-27

## 2019-07-27 DIAGNOSIS — Z11.3 SCREEN FOR STD (SEXUALLY TRANSMITTED DISEASE): ICD-10-CM

## 2019-07-27 DIAGNOSIS — A54.9 GONORRHEA: Primary | ICD-10-CM

## 2019-07-27 RX ORDER — AZITHROMYCIN 500 MG/1
1000 TABLET, FILM COATED ORAL DAILY
Qty: 2 TABLET | Refills: 0 | Status: SHIPPED | OUTPATIENT
Start: 2019-07-27 | End: 2019-07-28

## 2019-07-27 RX ORDER — CEFTRIAXONE SODIUM 250 MG/1
250 INJECTION, POWDER, FOR SOLUTION INTRAMUSCULAR; INTRAVENOUS ONCE
Qty: 2.5 ML | Refills: 0 | OUTPATIENT
Start: 2019-07-27 | End: 2019-07-27

## 2019-07-27 NOTE — TELEPHONE ENCOUNTER
Was in to the Dr on Thursday to be tested for STD's  and wants to know the results. Results not read by . Can't give out results. He is having symptoms from the UTI and the drainage from his penis. Asad very anxious about the results. I paged the on call, Dr Thompson at 9:57. 2nd page at 10:18 .Called back at 10:27and will call the patient to give results and get needed medications.    Gisel Blount RN/ [facility].    Reason for Disposition    Caller requesting lab results    Protocols used: PCP CALL - NO TRIAGE-A-AH

## 2019-07-27 NOTE — TELEPHONE ENCOUNTER
I got contacted from the [facility] answering service about patient. He was very anxious and wanted to know his results. Contacted patient and reviewed results with him. Sent treatment to his preferred pharmacy and answered questions.

## 2019-07-29 ENCOUNTER — ALLIED HEALTH/NURSE VISIT (OUTPATIENT)
Dept: PEDIATRICS | Facility: CLINIC | Age: 44
End: 2019-07-29
Payer: COMMERCIAL

## 2019-07-29 DIAGNOSIS — A54.9 GONORRHEA: Primary | ICD-10-CM

## 2019-07-29 DIAGNOSIS — A54.01 GONOCOCCAL URETHRITIS IN MALE: ICD-10-CM

## 2019-07-29 PROCEDURE — 96372 THER/PROPH/DIAG INJ SC/IM: CPT

## 2019-07-29 PROCEDURE — 99207 ZZC NO CHARGE NURSE ONLY: CPT

## 2019-07-29 RX ORDER — CEFTRIAXONE SODIUM 250 MG
250 VIAL (EA) INJECTION ONCE
Status: COMPLETED | OUTPATIENT
Start: 2019-07-29 | End: 2019-07-29

## 2019-07-29 RX ORDER — CEFTRIAXONE SODIUM 250 MG
250 VIAL (EA) INJECTION ONCE
OUTPATIENT
Start: 2019-07-29 | End: 2019-07-29

## 2019-07-29 RX ADMIN — Medication 250 MG: at 15:54

## 2019-07-29 NOTE — PROGRESS NOTES
Called patient, patient placed on schedule to see RN for injection.  Will place CAM order within the visit.    Camilla Parra RN, [facility]

## 2019-07-29 NOTE — PROGRESS NOTES
César Conn comes into clinic today at the request of Dr. Thompson Ordering Provider for Med Injection only cefTRIAXone (ROCEPHIN) injection 250 mg.    Clinic Administered Medication Documentation      Injectable Medication Documentation    Patient was given Ceftriaxone Sodium (Rocephin). Prior to medication administration, verified patients identity using patient s name and date of birth. Please see MAR and medication order for additional information. Patient instructed to remain in clinic for 15 minutes and report any adverse reaction to staff immediately .      Was entire vial of medication used? Yes  Vial/Syringe: Single dose vial  Expiration Date:  7/1/2020  Was this medication supplied by the patient? No      This service provided today was under the supervising provider of the day Moni Stein CNP, who was available if needed.    Shikha Milner, CMA

## 2019-07-30 ENCOUNTER — PRE VISIT (OUTPATIENT)
Dept: OTOLARYNGOLOGY | Facility: CLINIC | Age: 44
End: 2019-07-30

## 2019-08-02 ENCOUNTER — TELEPHONE (OUTPATIENT)
Dept: INTERNAL MEDICINE | Facility: CLINIC | Age: 44
End: 2019-08-02

## 2019-08-02 DIAGNOSIS — R30.0 DYSURIA: Primary | ICD-10-CM

## 2019-08-02 DIAGNOSIS — Z11.3 SCREEN FOR STD (SEXUALLY TRANSMITTED DISEASE): ICD-10-CM

## 2019-08-02 NOTE — TELEPHONE ENCOUNTER
Health Call Center    Phone Message    May a detailed message be left on voicemail: yes    Reason for Call: Symptoms or Concerns     If patient has red-flag symptoms, warm transfer to triage line    Current symptom or concern:  Patient is calling about still some stinging with urination and he still has some blood in his stream.  He received a shot for gonorrhea on 7/29/19 up at [place] (please read telephone encounter from 7/29, involves both [facility] and [facility]).  He is just wondering if this is still normal.  He received the shot and then took the two pills of erythromycin as instructed.    Symptoms have been present for:  5 day(s)    Has patient previously been seen for this? Yes    By : Seen in [place] for GC shot    Date: 7/29/19    Are there any new or worsening symptoms? No      Action Taken: Message routed to:  [facility] ([facility]): Primary Care

## 2019-08-05 DIAGNOSIS — R30.0 DYSURIA: ICD-10-CM

## 2019-08-05 DIAGNOSIS — Z11.3 SCREEN FOR STD (SEXUALLY TRANSMITTED DISEASE): ICD-10-CM

## 2019-08-05 LAB
ALBUMIN UR-MCNC: NEGATIVE MG/DL
APPEARANCE UR: CLEAR
BILIRUB UR QL STRIP: NEGATIVE
COLOR UR AUTO: YELLOW
GLUCOSE UR STRIP-MCNC: NEGATIVE MG/DL
HGB UR QL STRIP: NEGATIVE
KETONES UR STRIP-MCNC: NEGATIVE MG/DL
LEUKOCYTE ESTERASE UR QL STRIP: NEGATIVE
NITRATE UR QL: NEGATIVE
PH UR STRIP: 7.5 PH (ref 5–7)
RBC #/AREA URNS AUTO: ABNORMAL /HPF
SOURCE: ABNORMAL
SP GR UR STRIP: 1.01 (ref 1–1.03)
UROBILINOGEN UR STRIP-MCNC: NORMAL MG/DL (ref 0–2)
WBC #/AREA URNS AUTO: ABNORMAL /HPF

## 2019-08-05 PROCEDURE — 87086 URINE CULTURE/COLONY COUNT: CPT | Performed by: INTERNAL MEDICINE

## 2019-08-05 PROCEDURE — 81001 URINALYSIS AUTO W/SCOPE: CPT | Performed by: INTERNAL MEDICINE

## 2019-08-05 PROCEDURE — 87491 CHLMYD TRACH DNA AMP PROBE: CPT | Performed by: INTERNAL MEDICINE

## 2019-08-05 PROCEDURE — 87591 N.GONORRHOEAE DNA AMP PROB: CPT | Performed by: INTERNAL MEDICINE

## 2019-08-05 NOTE — TELEPHONE ENCOUNTER
Spoke to patient who states he got the shot and medication for gonorrhea last Monday 7/29/19. Patient states that he is still experiencing burning when peeing and small amount of blood in his urine. Patient states that his symptoms are slightly better then they were, but he is still experiencing them. Carol Schumacher, EVAN 8/5/2019 9:00 AM    We should recheck the urine for GC and chlamydia before deciding on retreatment  JL

## 2019-08-05 NOTE — TELEPHONE ENCOUNTER
Spoke to patient to relay that he will need to come in and leave a urine sample to do another test and then based off of test results, possible treatment will be discussed. [place] [facility] [place]. Carol Schumacher LPN 8/5/2019 12:35 PM

## 2019-08-06 LAB
BACTERIA SPEC CULT: NO GROWTH
C TRACH DNA SPEC QL NAA+PROBE: NEGATIVE
N GONORRHOEA DNA SPEC QL NAA+PROBE: NEGATIVE
SPECIMEN SOURCE: NORMAL

## 2019-08-07 ENCOUNTER — NURSE TRIAGE (OUTPATIENT)
Dept: NURSING | Facility: CLINIC | Age: 44
End: 2019-08-07

## 2019-08-07 NOTE — TELEPHONE ENCOUNTER
I reviewed the urine lab results with the patient. He has no further questions at this time.  Fide Nuñez RN-[facility]

## 2020-09-21 ENCOUNTER — OFFICE VISIT (OUTPATIENT)
Dept: SURGERY | Facility: CLINIC | Age: 45
End: 2020-09-21
Payer: COMMERCIAL

## 2020-09-21 VITALS
DIASTOLIC BLOOD PRESSURE: 87 MMHG | TEMPERATURE: 97.9 F | HEART RATE: 70 BPM | SYSTOLIC BLOOD PRESSURE: 136 MMHG | HEIGHT: 69 IN | BODY MASS INDEX: 24.57 KG/M2 | OXYGEN SATURATION: 98 % | WEIGHT: 165.9 LBS

## 2020-09-21 DIAGNOSIS — K64.8 INTERNAL HEMORRHOIDS: Primary | ICD-10-CM

## 2020-09-21 DIAGNOSIS — K62.5 RECTAL BLEEDING: ICD-10-CM

## 2020-09-21 RX ORDER — HYDROCODONE BITARTRATE AND ACETAMINOPHEN 5; 325 MG/1; MG/1
1 TABLET ORAL EVERY 6 HOURS PRN
Qty: 3 TABLET | Refills: 0 | Status: SHIPPED | OUTPATIENT
Start: 2020-09-21

## 2020-09-21 RX ORDER — BUPROPION HYDROCHLORIDE 150 MG/1
TABLET ORAL
COMMUNITY
Start: 2020-08-28

## 2020-09-21 ASSESSMENT — MIFFLIN-ST. JEOR: SCORE: 1642.9

## 2020-09-21 ASSESSMENT — PAIN SCALES - GENERAL: PAINLEVEL: NO PAIN (0)

## 2020-09-21 NOTE — LETTER
"2020       RE: César Conn  [address] [place] [address]     Dear Colleague,    Thank you for referring your patient, César Conn, to the [facility] COLON AND RECTAL SURGERY at [facility]. Please see a copy of my visit note below.    Colon and Rectal Surgery Follow-Up Clinic Note    RE: César Conn  : 1975  GOPAL: 2020    César Conn is a very pleasant 44 year old male here for follow-up of hemorrhoids.    Interval history: I have seen César in the past with hemorrhoid banding in 2016 and anal fistula in 2018. In 2018 we had discussed examination under anesthesia with possible fistulotomy and possible seton placement and he requested a hemorrhoidectomy at that time but he did not follow up for that.   He feels that the fistula has completely healed. He denies any pain or purulent drainage. He did just had a return of rectal bleeding that took a while to stop. No associated pain. No tissue that needs to be manually reduced. He would like further banding and is not interested in surgical hemorrhoidectomy at this time.  He just recently had surgery for bladder cancer. He continues to smoke 4 cigarettes a day.  He had a normal colonoscopy on 2017.    Family History:  Father with esophageal cancer, mother with lung cancer, paternal grandfather with pancreatic cancer    Physical Examination: Exam was chaperoned by Len Cruz, EMT   /87 (BP Location: Left arm, Patient Position: Sitting, Cuff Size: Adult Regular)   Pulse 70   Temp 97.9  F (36.6  C) (Oral)   Ht 5' 9\"   Wt 165 lb 14.4 oz   SpO2 98%   BMI 24.50 kg/m    General: alert, oriented, in no acute distress, sitting comfortably  HEENT: mucous membranes moist  Perianal external examination:  Perianal skin: Intact with no excoriation or lichenification.  Lesions: No evidence of an external lesion, nodularity, or induration in the perianal region.  Eversion of buttocks: There was " not evidence of an anal fissure. Details: N/A.  Skin tags or external hemorrhoids: Yes: some prolapsing hemorhoids.    Digital rectal examination: Was performed.   Sphincter tone: Good.  Palpable lesions: No.  Prostate: Normal.  Other: None..    Anoscopy: Was performed.   Hemorrhoids: Yes. Grade 3 internal hemorrhoids without active bleeding.   Lesions: No. Scarring in the posterior midline but no purulent drainage or evidence of anal fistula    Assessment/Plan: 44 year old male with rectal bleeding and internal hemorrhoids.  Patient requested hemorrhoid banding again and he is not interested in surgical hemorrhoidectomy.  His hemorrhoids are quite large and did discuss that he may need if he continues to have rectal bleeding.  He states understanding of this.  He has had quite a bit of pain with banding in the past but does well with taking 1-2 Norco.  I prescribed these today and placed 2 bands.  He is moving to [place] in the next few weeks but will be back here for follow-up of his bladder cancer and would like to follow-up in clinic for repeat banding if he continues to have bleeding.  Encouraged him to contact the clinic in the meantime with any questions or concerns. Patient's questions were answered to his stated satisfaction and he is in agreement with this plan.    Medical history:  Past Medical History:   Diagnosis Date     Anxiety      Asthma      Depression      GERD (gastroesophageal reflux disease)      Gonococcal urethritis in male 7/29/2019     History of ETOH abuse      Shoulder separation     bilateral ('99, '10)       Surgical history:  Past Surgical History:   Procedure Laterality Date     APPENDECTOMY  1995    open     HEMORRHOIDECTOMY BANDING  2016    Total of 3 times since 2017     shoulder separation repair Bilateral        Problem list:    Patient Active Problem List    Diagnosis Date Noted     Gonococcal urethritis in male 07/29/2019     Priority: Medium     Benign essential hypertension  03/05/2019     Priority: Medium     Neck nodule 03/05/2019     Priority: Medium     Elevated LFTs 03/05/2019     Priority: Medium     Excessive drinking alcohol 03/05/2019     Priority: Medium       Medications:  Current Outpatient Medications   Medication Sig Dispense Refill     HYDROcodone-acetaminophen (NORCO) 5-325 MG tablet Take 1 tablet by mouth every 6 hours as needed for severe pain 3 tablet 0     multivitamin, therapeutic with minerals (MULTI-VITAMIN) TABS Take 1 tablet by mouth daily       ALPRAZolam (XANAX) 0.25 MG tablet Take 0.25 mg by mouth as needed for anxiety       buPROPion (WELLBUTRIN XL) 150 MG 24 hr tablet TK 1 T PO QD       escitalopram (LEXAPRO) 10 MG tablet Take 1 tablet (10 mg) by mouth daily (Patient not taking: Reported on 9/21/2020) 30 tablet 11     fluticasone (FLONASE) 50 MCG/ACT nasal spray Spray 2 sprays into both nostrils daily (Patient not taking: Reported on 9/21/2020) 15.8 mL 3     hydrOXYzine (ATARAX) 25 MG tablet Take 1 tablet (25 mg) by mouth 3 times daily as needed for anxiety (Patient not taking: Reported on 9/21/2020) 30 tablet 3     losartan (COZAAR) 50 MG tablet Take 1 tablet (50 mg) by mouth daily (Patient not taking: Reported on 9/21/2020) 30 tablet 1     sodium chloride (OCEAN) 0.65 % nasal spray Spray 2 sprays in nostril as needed         Allergies:  No Known Allergies    Family history:  Family History   Problem Relation Age of Onset     Diabetes Mother      Esophageal Cancer Father        Social history:  Social History     Tobacco Use     Smoking status: Current Every Day Smoker     Packs/day: 0.30     Types: Cigarettes     Smokeless tobacco: Never Used     Tobacco comment: 4 cigs a day   Substance Use Topics     Alcohol use: Yes     Frequency: 4 or more times a week     Drinks per session: 7 to 9     Binge frequency: Weekly     Comment: Former 750ml-1L liquor/day ([id number])     Marital status: single.  There are no exam notes on file for this visit.     Total face  to face time was 15 minutes, >50% counseling, in addition to procedure time    LOUIE Miller  Colon and Rectal Surgery  [facility]      Again, thank you for allowing me to participate in the care of your patient.      Sincerely,    CAMI Miller CNP

## 2020-09-21 NOTE — PROGRESS NOTES
"Colon and Rectal Surgery Follow-Up Clinic Note    RE: César Conn  : 1975  GOPAL: 2020    César Conn is a very pleasant 44 year old male here for follow-up of hemorrhoids.    Interval history: I have seen César in the past with hemorrhoid banding in 2016 and anal fistula in 2018. In 2018 we had discussed examination under anesthesia with possible fistulotomy and possible seton placement and he requested a hemorrhoidectomy at that time but he did not follow up for that.   He feels that the fistula has completely healed. He denies any pain or purulent drainage. He did just had a return of rectal bleeding that took a while to stop. No associated pain. No tissue that needs to be manually reduced. He would like further banding and is not interested in surgical hemorrhoidectomy at this time.  He just recently had surgery for bladder cancer. He continues to smoke 4 cigarettes a day.  He had a normal colonoscopy on 2017.    Family History:  Father with esophageal cancer, mother with lung cancer, paternal grandfather with pancreatic cancer    Physical Examination: Exam was chaperoned by Len Cruz, EMT   /87 (BP Location: Left arm, Patient Position: Sitting, Cuff Size: Adult Regular)   Pulse 70   Temp 97.9  F (36.6  C) (Oral)   Ht 5' 9\"   Wt 165 lb 14.4 oz   SpO2 98%   BMI 24.50 kg/m    General: alert, oriented, in no acute distress, sitting comfortably  HEENT: mucous membranes moist  Perianal external examination:  Perianal skin: Intact with no excoriation or lichenification.  Lesions: No evidence of an external lesion, nodularity, or induration in the perianal region.  Eversion of buttocks: There was not evidence of an anal fissure. Details: N/A.  Skin tags or external hemorrhoids: Yes: some prolapsing hemorhoids.    Digital rectal examination: Was performed.   Sphincter tone: Good.  Palpable lesions: No.  Prostate: Normal.  Other: None..    Anoscopy: Was performed.   Hemorrhoids: " Yes. Grade 3 internal hemorrhoids without active bleeding.   Lesions: No. Scarring in the posterior midline but no purulent drainage or evidence of anal fistula    Assessment/Plan: 44 year old male with rectal bleeding and internal hemorrhoids.  Patient requested hemorrhoid banding again and he is not interested in surgical hemorrhoidectomy.  His hemorrhoids are quite large and did discuss that he may need if he continues to have rectal bleeding.  He states understanding of this.  He has had quite a bit of pain with banding in the past but does well with taking 1-2 Norco.  I prescribed these today and placed 2 bands.  He is moving to [place] in the next few weeks but will be back here for follow-up of his bladder cancer and would like to follow-up in clinic for repeat banding if he continues to have bleeding.  Encouraged him to contact the clinic in the meantime with any questions or concerns. Patient's questions were answered to his stated satisfaction and he is in agreement with this plan.    Medical history:  Past Medical History:   Diagnosis Date     Anxiety      Asthma      Depression      GERD (gastroesophageal reflux disease)      Gonococcal urethritis in male 7/29/2019     History of ETOH abuse      Shoulder separation     bilateral ('99, '10)       Surgical history:  Past Surgical History:   Procedure Laterality Date     APPENDECTOMY  1995    open     HEMORRHOIDECTOMY BANDING  2016    Total of 3 times since 2017     shoulder separation repair Bilateral        Problem list:    Patient Active Problem List    Diagnosis Date Noted     Gonococcal urethritis in male 07/29/2019     Priority: Medium     Benign essential hypertension 03/05/2019     Priority: Medium     Neck nodule 03/05/2019     Priority: Medium     Elevated LFTs 03/05/2019     Priority: Medium     Excessive drinking alcohol 03/05/2019     Priority: Medium       Medications:  Current Outpatient Medications   Medication Sig Dispense Refill      HYDROcodone-acetaminophen (NORCO) 5-325 MG tablet Take 1 tablet by mouth every 6 hours as needed for severe pain 3 tablet 0     multivitamin, therapeutic with minerals (MULTI-VITAMIN) TABS Take 1 tablet by mouth daily       ALPRAZolam (XANAX) 0.25 MG tablet Take 0.25 mg by mouth as needed for anxiety       buPROPion (WELLBUTRIN XL) 150 MG 24 hr tablet TK 1 T PO QD       escitalopram (LEXAPRO) 10 MG tablet Take 1 tablet (10 mg) by mouth daily (Patient not taking: Reported on 9/21/2020) 30 tablet 11     fluticasone (FLONASE) 50 MCG/ACT nasal spray Spray 2 sprays into both nostrils daily (Patient not taking: Reported on 9/21/2020) 15.8 mL 3     hydrOXYzine (ATARAX) 25 MG tablet Take 1 tablet (25 mg) by mouth 3 times daily as needed for anxiety (Patient not taking: Reported on 9/21/2020) 30 tablet 3     losartan (COZAAR) 50 MG tablet Take 1 tablet (50 mg) by mouth daily (Patient not taking: Reported on 9/21/2020) 30 tablet 1     sodium chloride (OCEAN) 0.65 % nasal spray Spray 2 sprays in nostril as needed         Allergies:  No Known Allergies    Family history:  Family History   Problem Relation Age of Onset     Diabetes Mother      Esophageal Cancer Father        Social history:  Social History     Tobacco Use     Smoking status: Current Every Day Smoker     Packs/day: 0.30     Types: Cigarettes     Smokeless tobacco: Never Used     Tobacco comment: 4 cigs a day   Substance Use Topics     Alcohol use: Yes     Frequency: 4 or more times a week     Drinks per session: 7 to 9     Binge frequency: Weekly     Comment: Former 750ml-1L liquor/day ([id number])     Marital status: single.  There are no exam notes on file for this visit.     Total face to face time was 15 minutes, >50% counseling, in addition to procedure time    LOUIE Miller  Colon and Rectal Surgery  [facility]

## 2020-09-21 NOTE — LETTER
"2020       RE: César Conn  [address] [place] [address]     Dear Colleague,    Thank you for referring your patient, César Conn, to the [facility] COLON AND RECTAL SURGERY at [facility]. Please see a copy of my visit note below.    Colon and Rectal Surgery Follow-Up Clinic Note    RE: César Conn  : 1975  GOPAL: 2020    César Conn is a very pleasant 44 year old male here for follow-up of hemorrhoids.    Interval history: I have seen César in the past with hemorrhoid banding in 2016 and anal fistula in 2018. In 2018 we had discussed examination under anesthesia with possible fistulotomy and possible seton placement and he requested a hemorrhoidectomy at that time but he did not follow up for that.   He feels that the fistula has completely healed. He denies any pain or purulent drainage. He did just had a return of rectal bleeding that took a while to stop. No associated pain. No tissue that needs to be manually reduced. He would like further banding and is not interested in surgical hemorrhoidectomy at this time.  He just recently had surgery for bladder cancer. He continues to smoke 4 cigarettes a day.  He had a normal colonoscopy on 2017.    Family History:  Father with esophageal cancer, mother with lung cancer, paternal grandfather with pancreatic cancer    Physical Examination: Exam was chaperoned by Len Cruz, EMT   /87 (BP Location: Left arm, Patient Position: Sitting, Cuff Size: Adult Regular)   Pulse 70   Temp 97.9  F (36.6  C) (Oral)   Ht 5' 9\"   Wt 165 lb 14.4 oz   SpO2 98%   BMI 24.50 kg/m    General: alert, oriented, in no acute distress, sitting comfortably  HEENT: mucous membranes moist  Perianal external examination:  Perianal skin: Intact with no excoriation or lichenification.  Lesions: No evidence of an external lesion, nodularity, or induration in the perianal region.  Eversion of buttocks: There was " not evidence of an anal fissure. Details: N/A.  Skin tags or external hemorrhoids: Yes: some prolapsing hemorhoids.    Digital rectal examination: Was performed.   Sphincter tone: Good.  Palpable lesions: No.  Prostate: Normal.  Other: None..    Anoscopy: Was performed.   Hemorrhoids: Yes. Grade 3 internal hemorrhoids without active bleeding.   Lesions: No. Scarring in the posterior midline but no purulent drainage or evidence of anal fistula    Assessment/Plan: 44 year old male with rectal bleeding and internal hemorrhoids.  Patient requested hemorrhoid banding again and he is not interested in surgical hemorrhoidectomy.  His hemorrhoids are quite large and did discuss that he may need if he continues to have rectal bleeding.  He states understanding of this.  He has had quite a bit of pain with banding in the past but does well with taking 1-2 Norco.  I prescribed these today and placed 2 bands.  He is moving to [place] in the next few weeks but will be back here for follow-up of his bladder cancer and would like to follow-up in clinic for repeat banding if he continues to have bleeding.  Encouraged him to contact the clinic in the meantime with any questions or concerns. Patient's questions were answered to his stated satisfaction and he is in agreement with this plan.    Medical history:  Past Medical History:   Diagnosis Date     Anxiety      Asthma      Depression      GERD (gastroesophageal reflux disease)      Gonococcal urethritis in male 7/29/2019     History of ETOH abuse      Shoulder separation     bilateral ('99, '10)     Surgical history:  Past Surgical History:   Procedure Laterality Date     APPENDECTOMY  1995    open     HEMORRHOIDECTOMY BANDING  2016    Total of 3 times since 2017     shoulder separation repair Bilateral      Problem list:    Patient Active Problem List    Diagnosis Date Noted     Gonococcal urethritis in male 07/29/2019     Priority: Medium     Benign essential hypertension  03/05/2019     Priority: Medium     Neck nodule 03/05/2019     Priority: Medium     Elevated LFTs 03/05/2019     Priority: Medium     Excessive drinking alcohol 03/05/2019     Priority: Medium     Medications:  Current Outpatient Medications   Medication Sig Dispense Refill     HYDROcodone-acetaminophen (NORCO) 5-325 MG tablet Take 1 tablet by mouth every 6 hours as needed for severe pain 3 tablet 0     multivitamin, therapeutic with minerals (MULTI-VITAMIN) TABS Take 1 tablet by mouth daily       ALPRAZolam (XANAX) 0.25 MG tablet Take 0.25 mg by mouth as needed for anxiety       buPROPion (WELLBUTRIN XL) 150 MG 24 hr tablet TK 1 T PO QD       escitalopram (LEXAPRO) 10 MG tablet Take 1 tablet (10 mg) by mouth daily (Patient not taking: Reported on 9/21/2020) 30 tablet 11     fluticasone (FLONASE) 50 MCG/ACT nasal spray Spray 2 sprays into both nostrils daily (Patient not taking: Reported on 9/21/2020) 15.8 mL 3     hydrOXYzine (ATARAX) 25 MG tablet Take 1 tablet (25 mg) by mouth 3 times daily as needed for anxiety (Patient not taking: Reported on 9/21/2020) 30 tablet 3     losartan (COZAAR) 50 MG tablet Take 1 tablet (50 mg) by mouth daily (Patient not taking: Reported on 9/21/2020) 30 tablet 1     sodium chloride (OCEAN) 0.65 % nasal spray Spray 2 sprays in nostril as needed       Allergies:  No Known Allergies    Family history:  Family History   Problem Relation Age of Onset     Diabetes Mother      Esophageal Cancer Father      Social history:  Social History     Tobacco Use     Smoking status: Current Every Day Smoker     Packs/day: 0.30     Types: Cigarettes     Smokeless tobacco: Never Used     Tobacco comment: 4 cigs a day   Substance Use Topics     Alcohol use: Yes     Frequency: 4 or more times a week     Drinks per session: 7 to 9     Binge frequency: Weekly     Comment: Former 750ml-1L liquor/day ([id number])     Marital status: single.  There are no exam notes on file for this visit.     Total face to  face time was 15 minutes, >50% counseling, in addition to procedure time    Again, thank you for allowing me to participate in the care of your patient.  Sincerely,    CELIA MillerC  Colon and Rectal Surgery  [facility]

## 2020-12-01 ENCOUNTER — TELEPHONE (OUTPATIENT)
Dept: SURGERY | Facility: CLINIC | Age: 45
End: 2020-12-01
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
## 2024-11-13 ENCOUNTER — OFFICE VISIT (OUTPATIENT)
Dept: FAMILY MEDICINE | Facility: OTHER | Age: 49
End: 2024-11-13
Payer: COMMERCIAL

## 2024-11-13 ENCOUNTER — TELEPHONE (OUTPATIENT)
Dept: FAMILY MEDICINE | Facility: OTHER | Age: 49
End: 2024-11-13

## 2024-11-13 VITALS
HEIGHT: 69 IN | WEIGHT: 166 LBS | RESPIRATION RATE: 12 BRPM | HEART RATE: 72 BPM | DIASTOLIC BLOOD PRESSURE: 85 MMHG | BODY MASS INDEX: 24.59 KG/M2 | OXYGEN SATURATION: 99 % | TEMPERATURE: 98.5 F | SYSTOLIC BLOOD PRESSURE: 148 MMHG

## 2024-11-13 DIAGNOSIS — Z13.1 SCREENING FOR DIABETES MELLITUS: ICD-10-CM

## 2024-11-13 DIAGNOSIS — M89.8X1 PAIN OF LEFT CLAVICLE: Primary | ICD-10-CM

## 2024-11-13 DIAGNOSIS — Z12.5 SCREENING PSA (PROSTATE SPECIFIC ANTIGEN): ICD-10-CM

## 2024-11-13 DIAGNOSIS — I10 BENIGN ESSENTIAL HYPERTENSION: ICD-10-CM

## 2024-11-13 DIAGNOSIS — Z13.1 SCREENING FOR DIABETES MELLITUS: Primary | ICD-10-CM

## 2024-11-13 DIAGNOSIS — M89.8X1 PAIN OF LEFT CLAVICLE: ICD-10-CM

## 2024-11-13 RX ORDER — LOSARTAN POTASSIUM 25 MG/1
25 TABLET ORAL DAILY
Qty: 30 TABLET | Refills: 2 | Status: SHIPPED | OUTPATIENT
Start: 2024-11-13

## 2024-11-13 ASSESSMENT — PAIN SCALES - GENERAL: PAINLEVEL_OUTOF10: MILD PAIN (2)

## 2024-11-13 NOTE — PROGRESS NOTES
Assessment & Plan     Pain of left clavicle  Patient is coming in with possible injury to his left clavicle.  Does have a history of AC joint separation bilaterally with surgical intervention in the past.  On exam, some deformity present as well as tenderness.  There is AC joint.  Clavicle x-ray ordered today  - XR Clavicle Left 2 Views; Future    Hypertension  BP 140s systolic today. Will start losartan 25 mg. Discussed lifestyle modifications.    Screening for diabetes mellitus  - Comprehensive metabolic panel (BMP + Alb, Alk Phos, ALT, AST, Total. Bili, TP); Future  - Lipid panel reflex to direct LDL Fasting; Future  - losartan (COZAAR) 25 MG tablet; Take 1 tablet (25 mg) by mouth daily.    Screening PSA (prostate specific antigen)  - PSA, screen; Future    The longitudinal plan of care for the diagnosis(es)/condition(s) as documented were addressed during this visit. Due to the added complexity in care, I will continue to support Asad in the subsequent management and with ongoing continuity of care.    Follow-up for hypertension in the next 2-3 months.    Subjective   Asad is a 49 year old, presenting for the following health issues:  Establish Care and Prostate Check        11/13/2024     2:17 PM   Additional Questions   Roomed by Royer   Accompanied by self     HPI     Patient was diagnosed with bladder tumors about 4-5 years ago, continues to have surgically excised about yearly.  low-grade non muscle invasive urothelial carcinoma of the bladder     Patient has some concerns about pre-diabetes, some family history of DM2.    Has had left clavicular pain. Hx of separation has some tingling of left the arm    Has had some difference in scrotum and feels that left testis is higher     He is taking Wellbutrin 150 mg regularly.   He quit smoking cigarettes about 3 years ago. He does use marijuana regularly.        Objective    BP (!) 152/94   Pulse 72   Temp 98.5  F (36.9  C) (Temporal)   Resp 12   Ht 1.745 m  "(5' 8.7\")   Wt 75.3 kg (166 lb)   SpO2 99%   BMI 24.73 kg/m    Body mass index is 24.73 kg/m .  Physical Exam  Constitutional:       General: He is not in acute distress.     Appearance: Normal appearance. He is not ill-appearing.   Cardiovascular:      Rate and Rhythm: Normal rate and regular rhythm.      Heart sounds: No murmur heard.  Pulmonary:      Effort: Pulmonary effort is normal. No respiratory distress.      Breath sounds: Normal breath sounds.   Genitourinary:     Comments: Scrotal exam with right testis lower-hanging than left, otherwise is unremarkable.    Prostate exam without notable nodularity.  Skin:     General: Skin is warm and dry.   Neurological:      General: No focal deficit present.      Mental Status: He is alert and oriented to person, place, and time.   Psychiatric:         Mood and Affect: Mood normal.         Behavior: Behavior normal.                  Signed Electronically by: Berry Deshpande DO    "

## 2024-11-13 NOTE — TELEPHONE ENCOUNTER
Message left for patient, needing to schedule some follow up appointments.     Blood pressure virtual appointment for December 17th with Dr. Deshpande  Schedule physical for 3/25 with Dr. Deshpande  Lab, xray scheduled for tomorrow, 11/24/24 at noon    Please assist patient when he calls back    Shelia Patricia MA on 11/13/2024 at 2:21 PM

## 2024-11-15 ENCOUNTER — ANCILLARY PROCEDURE (OUTPATIENT)
Dept: GENERAL RADIOLOGY | Facility: OTHER | Age: 49
End: 2024-11-15
Payer: COMMERCIAL

## 2024-11-15 ENCOUNTER — LAB (OUTPATIENT)
Dept: LAB | Facility: OTHER | Age: 49
End: 2024-11-15
Payer: COMMERCIAL

## 2024-11-15 DIAGNOSIS — M89.8X1 PAIN OF LEFT CLAVICLE: ICD-10-CM

## 2024-11-15 DIAGNOSIS — Z13.1 SCREENING FOR DIABETES MELLITUS: ICD-10-CM

## 2024-11-15 DIAGNOSIS — Z12.5 SCREENING PSA (PROSTATE SPECIFIC ANTIGEN): ICD-10-CM

## 2024-11-15 LAB
ALBUMIN SERPL BCG-MCNC: 4.9 G/DL (ref 3.5–5.2)
ALP SERPL-CCNC: 84 U/L (ref 40–150)
ALT SERPL W P-5'-P-CCNC: 24 U/L (ref 0–70)
ANION GAP SERPL CALCULATED.3IONS-SCNC: 9 MMOL/L (ref 7–15)
AST SERPL W P-5'-P-CCNC: 19 U/L (ref 0–45)
BILIRUB SERPL-MCNC: 0.4 MG/DL
BUN SERPL-MCNC: 9.2 MG/DL (ref 6–20)
CALCIUM SERPL-MCNC: 9.4 MG/DL (ref 8.8–10.4)
CHLORIDE SERPL-SCNC: 104 MMOL/L (ref 98–107)
CHOLEST SERPL-MCNC: 223 MG/DL
CREAT SERPL-MCNC: 0.91 MG/DL (ref 0.67–1.17)
EGFRCR SERPLBLD CKD-EPI 2021: >90 ML/MIN/1.73M2
FASTING STATUS PATIENT QL REPORTED: NO
FASTING STATUS PATIENT QL REPORTED: NO
GLUCOSE SERPL-MCNC: 92 MG/DL (ref 70–99)
HCO3 SERPL-SCNC: 28 MMOL/L (ref 22–29)
HDLC SERPL-MCNC: 65 MG/DL
LDLC SERPL CALC-MCNC: 133 MG/DL
NONHDLC SERPL-MCNC: 158 MG/DL
POTASSIUM SERPL-SCNC: 4.6 MMOL/L (ref 3.4–5.3)
PROT SERPL-MCNC: 7.3 G/DL (ref 6.4–8.3)
PSA SERPL DL<=0.01 NG/ML-MCNC: 1.05 NG/ML (ref 0–2.5)
SODIUM SERPL-SCNC: 141 MMOL/L (ref 135–145)
TRIGL SERPL-MCNC: 125 MG/DL

## 2024-11-15 PROCEDURE — 36415 COLL VENOUS BLD VENIPUNCTURE: CPT

## 2024-11-15 PROCEDURE — 73000 X-RAY EXAM OF COLLAR BONE: CPT | Mod: TC | Performed by: STUDENT IN AN ORGANIZED HEALTH CARE EDUCATION/TRAINING PROGRAM

## 2024-11-15 PROCEDURE — 80061 LIPID PANEL: CPT

## 2024-11-15 PROCEDURE — G0103 PSA SCREENING: HCPCS

## 2024-11-15 PROCEDURE — 80053 COMPREHEN METABOLIC PANEL: CPT

## 2024-12-09 ENCOUNTER — OFFICE VISIT (OUTPATIENT)
Dept: ORTHOPEDICS | Facility: CLINIC | Age: 49
End: 2024-12-09
Payer: COMMERCIAL

## 2024-12-09 VITALS
HEIGHT: 69 IN | WEIGHT: 172 LBS | HEART RATE: 83 BPM | BODY MASS INDEX: 25.48 KG/M2 | SYSTOLIC BLOOD PRESSURE: 158 MMHG | DIASTOLIC BLOOD PRESSURE: 102 MMHG | TEMPERATURE: 97.8 F

## 2024-12-09 DIAGNOSIS — R20.0 NUMBNESS AND TINGLING IN LEFT HAND: ICD-10-CM

## 2024-12-09 DIAGNOSIS — M25.512 CHRONIC LEFT SHOULDER PAIN: Primary | ICD-10-CM

## 2024-12-09 DIAGNOSIS — G89.29 CHRONIC LEFT SHOULDER PAIN: Primary | ICD-10-CM

## 2024-12-09 DIAGNOSIS — M89.8X1 PAIN OF LEFT CLAVICLE: ICD-10-CM

## 2024-12-09 DIAGNOSIS — R20.2 NUMBNESS AND TINGLING IN LEFT HAND: ICD-10-CM

## 2024-12-09 PROCEDURE — 99204 OFFICE O/P NEW MOD 45 MIN: CPT | Performed by: ORTHOPAEDIC SURGERY

## 2024-12-09 NOTE — LETTER
"12/9/2024      César Conn  [address]  [place] [address]      Dear Colleague,    Thank you for referring your patient, César Conn, to the [facility]. Please see a copy of my visit note below.    ORTHOPEDIC CONSULT      Chief Complaint: César Conn is a 49 year old male who is being seen for   Chief Complaint   Patient presents with     Left Shoulder - Pain     Clavicle pain       History of Present Illness:   Here for the left shoulder. reports 20 yrs ago had AC joint separation surgery. Left shoulder had been doing well up until 8 months ago. He states was scrapping ice when he felt a new pop to the AC joint and immediate pain. Initially having pain that started in the shoulder and radiating down the entire arm with numbness/tingling. Over the last 8 months this has dramatically improved and now the pain is mostly focal to the anterior shoulder, the numbness/tingling has been slowly resolving and now only affecting the middle and ring finger, this is improving. He states currently a \"very light\" numbness.  Initially was struggling with gripping but this is improving.  He denies any currently neck pain that radiates down into the hand. States there is some neck and trapezius tightness with the shoulder though.  Feels his motion is intact. Has also noticed the distal clavicle is more prominent than it was prior to the new injury.        Patient's past medical, surgical, social and family histories reviewed.     Past Medical History:   Diagnosis Date     Anxiety      Asthma      Depression      GERD (gastroesophageal reflux disease)      Gonococcal urethritis in male 7/29/2019     History of ETOH abuse      Shoulder separation     bilateral ('99, '10)       Past Surgical History:   Procedure Laterality Date     APPENDECTOMY  1995    open     HEMORRHOIDECTOMY BANDING  2016    Total of 3 times since 2017     shoulder separation repair Bilateral  " "      Medications:  Current Outpatient Medications   Medication Sig Dispense Refill     losartan (COZAAR) 25 MG tablet Take 1 tablet (25 mg) by mouth daily. 30 tablet 2     losartan (COZAAR) 50 MG tablet Take 1 tablet (50 mg) by mouth daily 30 tablet 1     ALPRAZolam (XANAX) 0.25 MG tablet Take 0.25 mg by mouth as needed for anxiety (Patient not taking: Reported on 11/13/2024)       buPROPion (WELLBUTRIN XL) 150 MG 24 hr tablet TK 1 T PO QD       multivitamin, therapeutic with minerals (MULTI-VITAMIN) TABS Take 1 tablet by mouth daily (Patient not taking: Reported on 11/13/2024)       sodium chloride (OCEAN) 0.65 % nasal spray Spray 2 sprays in nostril as needed (Patient not taking: Reported on 11/13/2024)       No current facility-administered medications for this visit.       No Known Allergies    Social History     Occupational History     Not on file   Tobacco Use     Smoking status: Former     Current packs/day: 0.30     Types: Cigarettes     Smokeless tobacco: Never     Tobacco comments:     4 cigs a day   Substance and Sexual Activity     Alcohol use: Yes     Comment: Former 750ml-1L liquor/day ([id number])     Drug use: No     Sexual activity: Yes     Partners: Female       Family History   Problem Relation Age of Onset     Diabetes Mother      Esophageal Cancer Father        REVIEW OF SYSTEMS  10 point review systems performed otherwise negative as noted as per history of present illness.    Physical Exam:  Vitals: BP (!) 158/102   Pulse 83   Temp 97.8  F (36.6  C)   Ht 1.745 m (5' 8.7\")   Wt 78 kg (172 lb)   BMI 25.62 kg/m    BMI= Body mass index is 25.62 kg/m .  Constitutional: healthy, alert and no acute distress   Psychiatric: mentation appears normal and affect normal/bright  NEURO: no focal deficits  RESP: Normal with easy respirations and no use of accessory muscles to breathe, no audible wheezing or retractions  CV: No peripheral edema         Regular rate and rhythm by palpation  SKIN: No " "erythema, rashes, excoriation, or breakdown. No evidence of infection. Previous well healed surgical incision to the anterior shoulder consistent with prior AC joint surgery.    JOINT/EXTREMITIES:left shoulder: no effusion. Distal clavicle is more prominent than with the right. No skin tenting or tension on the skin. Mildly tender to the AC joint. No other focal or bony tenderness. No deltoid weakness. AROM 180/180/60/T11. No pain with motion. Mild pain with crossbody.  Negative speed's and jergesen's. Mild pain but no weakness with supraspinatus testing including empty.Negative drop arm.  Elbow motion intact.  strength equal to contralateral side. Questionable decreased sensation along middle and ring finger.  Able to make a closed fist. Skin pink warm dry. No thenar wasting or atrophy. Pulse 2+.    GAIT: not tested     Diagnostic Modalities:  None today.  Left clavicle x-rays taken November 15, 2024 shows no acute fractures or dislocations.  Evidence of previous AC reconstruction with metallic suture anchors in place along the distal clavicle and coracoid.  Calcification along the inferior aspect of the clavicle adjacent to the coracoid process.  Independent visualization of the images was performed.      Impression: left shoulder AC joint injury with history of previous reconstruction.   Left hand numbness/tingling.     Plan:  All of the above pertinent physical exam and imaging modalities findings was reviewed with César.  Reinjured the AC joint about 8 months ago. Has slowly getting improving but still having some pain and symptoms with use. Recommended therapy. He would like this as well.  The initial global arm numbness/tingling radiating into his hand has largely resolved and now \"mild numbness to the middle and ring finger\".  This continues to improve. Discussed options including EMG and he would like to continue to give it more time.  Will see back in 6 weeks, sooner if symptoms worsening.  "         Return to clinic 6 weeks or sooner as needed for changes.  Re-x-ray on return: No    Paul Miranda D.O.      Again, thank you for allowing me to participate in the care of your patient.        Sincerely,        Ever Miranda, DO

## 2024-12-09 NOTE — PROGRESS NOTES
"ORTHOPEDIC CONSULT      Chief Complaint: César Conn is a 49 year old male who is being seen for   Chief Complaint   Patient presents with    Left Shoulder - Pain     Clavicle pain       History of Present Illness:   Here for the left shoulder. reports 20 yrs ago had AC joint separation surgery. Left shoulder had been doing well up until 8 months ago. He states was scrapping ice when he felt a new pop to the AC joint and immediate pain. Initially having pain that started in the shoulder and radiating down the entire arm with numbness/tingling. Over the last 8 months this has dramatically improved and now the pain is mostly focal to the anterior shoulder, the numbness/tingling has been slowly resolving and now only affecting the middle and ring finger, this is improving. He states currently a \"very light\" numbness.  Initially was struggling with gripping but this is improving.  He denies any currently neck pain that radiates down into the hand. States there is some neck and trapezius tightness with the shoulder though.  Feels his motion is intact. Has also noticed the distal clavicle is more prominent than it was prior to the new injury.        Patient's past medical, surgical, social and family histories reviewed.     Past Medical History:   Diagnosis Date    Anxiety     Asthma     Depression     GERD (gastroesophageal reflux disease)     Gonococcal urethritis in male 7/29/2019    History of ETOH abuse     Shoulder separation     bilateral ('99, '10)       Past Surgical History:   Procedure Laterality Date    APPENDECTOMY  1995    open    HEMORRHOIDECTOMY BANDING  2016    Total of 3 times since 2017    shoulder separation repair Bilateral        Medications:  Current Outpatient Medications   Medication Sig Dispense Refill    losartan (COZAAR) 25 MG tablet Take 1 tablet (25 mg) by mouth daily. 30 tablet 2    losartan (COZAAR) 50 MG tablet Take 1 tablet (50 mg) by mouth daily 30 tablet 1    ALPRAZolam (XANAX) " "0.25 MG tablet Take 0.25 mg by mouth as needed for anxiety (Patient not taking: Reported on 11/13/2024)      buPROPion (WELLBUTRIN XL) 150 MG 24 hr tablet TK 1 T PO QD      multivitamin, therapeutic with minerals (MULTI-VITAMIN) TABS Take 1 tablet by mouth daily (Patient not taking: Reported on 11/13/2024)      sodium chloride (OCEAN) 0.65 % nasal spray Spray 2 sprays in nostril as needed (Patient not taking: Reported on 11/13/2024)       No current facility-administered medications for this visit.       No Known Allergies    Social History     Occupational History    Not on file   Tobacco Use    Smoking status: Former     Current packs/day: 0.30     Types: Cigarettes    Smokeless tobacco: Never    Tobacco comments:     4 cigs a day   Substance and Sexual Activity    Alcohol use: Yes     Comment: Former 750ml-1L liquor/day ([id number])    Drug use: No    Sexual activity: Yes     Partners: Female       Family History   Problem Relation Age of Onset    Diabetes Mother     Esophageal Cancer Father        REVIEW OF SYSTEMS  10 point review systems performed otherwise negative as noted as per history of present illness.    Physical Exam:  Vitals: BP (!) 158/102   Pulse 83   Temp 97.8  F (36.6  C)   Ht 1.745 m (5' 8.7\")   Wt 78 kg (172 lb)   BMI 25.62 kg/m    BMI= Body mass index is 25.62 kg/m .  Constitutional: healthy, alert and no acute distress   Psychiatric: mentation appears normal and affect normal/bright  NEURO: no focal deficits  RESP: Normal with easy respirations and no use of accessory muscles to breathe, no audible wheezing or retractions  CV: No peripheral edema         Regular rate and rhythm by palpation  SKIN: No erythema, rashes, excoriation, or breakdown. No evidence of infection. Previous well healed surgical incision to the anterior shoulder consistent with prior AC joint surgery.    JOINT/EXTREMITIES:left shoulder: no effusion. Distal clavicle is more prominent than with the right. No skin " "tenting or tension on the skin. Mildly tender to the AC joint. No other focal or bony tenderness. No deltoid weakness. AROM 180/180/60/T11. No pain with motion. Mild pain with crossbody.  Negative speed's and jergesen's. Mild pain but no weakness with supraspinatus testing including empty.Negative drop arm.  Elbow motion intact.  strength equal to contralateral side. Questionable decreased sensation along middle and ring finger.  Able to make a closed fist. Skin pink warm dry. No thenar wasting or atrophy. Pulse 2+.    GAIT: not tested     Diagnostic Modalities:  None today.  Left clavicle x-rays taken November 15, 2024 shows no acute fractures or dislocations.  Evidence of previous AC reconstruction with metallic suture anchors in place along the distal clavicle and coracoid.  Calcification along the inferior aspect of the clavicle adjacent to the coracoid process.  Independent visualization of the images was performed.      Impression: left shoulder AC joint injury with history of previous reconstruction.   Left hand numbness/tingling.     Plan:  All of the above pertinent physical exam and imaging modalities findings was reviewed with César.  Reinjured the AC joint about 8 months ago. Has slowly getting improving but still having some pain and symptoms with use. Recommended therapy. He would like this as well.  The initial global arm numbness/tingling radiating into his hand has largely resolved and now \"mild numbness to the middle and ring finger\".  This continues to improve. Discussed options including EMG and he would like to continue to give it more time.  Will see back in 6 weeks, sooner if symptoms worsening.          Return to clinic 6 weeks or sooner as needed for changes.  Re-x-ray on return: No    Paul Miranda D.O.  "

## 2025-06-17 ENCOUNTER — TELEPHONE (OUTPATIENT)
Dept: FAMILY MEDICINE | Facility: OTHER | Age: 50
End: 2025-06-17
Payer: COMMERCIAL

## 2025-06-17 NOTE — TELEPHONE ENCOUNTER
"RN called and spoke with patient. He is asymptomatic and reports that his blood pressures have been 150/90 \"for 25 years\". Was started back on Losartan 11/13/2024, 25mg after PCP visit. Reports that he checks his blood pressure consistently at home 3-4 times/week, takes medications daily, does not miss doses and blood pressures remain 150/90.     Patient would like PCP to review to determine if med or dose change is recommended. Also scheduled follow up visit for blood pressure follow up/med management.   Appointments in Next Year      Aug 05, 2025 11:00 AM  (Arrive by 10:40 AM)  Provider Visit with Berry Deshpande DO  [facility] ([facility] [place]) [phone]          Preferred pharmacy is still [facility] in [place].     Arlene Sherman, RN, BSN    "

## 2025-06-17 NOTE — TELEPHONE ENCOUNTER
FYI - Status Update    Who is Calling: patient    Update:  pt wanted to let provider know that with medication pt bp is still at  150/90 and looking to see if provider have alternative mediation / up the does. Seeking what the pcp will advise pt to do at this time.     Does caller want a call/response back: Yes     Okay to leave a detailed message?: Yes at Home number on file [phone] (home)

## 2025-06-18 NOTE — TELEPHONE ENCOUNTER
Berry saleem DO to [facility] (Selected Message)  AS      6/17/25  5:18 PM  Patient needs to have follow-up appt, looks like this has been scheduled. Can keep at current dose, consider increase at visit. Thank you     Berry Deshpande DO

## 2025-06-18 NOTE — TELEPHONE ENCOUNTER
Patient Contact    Attempt # 1    RN did attempt to reach Patient. No answer. Message left for Patient to call the clinic back and ask to speak to a triage nurse.     Shirlene Rothman RN on 6/18/2025 at 11:03 AM

## 2025-06-23 ENCOUNTER — TELEPHONE (OUTPATIENT)
Dept: FAMILY MEDICINE | Facility: OTHER | Age: 50
End: 2025-06-23
Payer: COMMERCIAL

## 2025-06-23 NOTE — TELEPHONE ENCOUNTER
Patient Quality Outreach    Patient is due for the following:   Hypertension -  Hypertension follow-up visit  Physical Preventive Adult Physical    Action(s) Taken:   Patient has upcoming appointment, these items will be addressed at that time.    Type of outreach:    Chart review performed, no outreach needed.    Questions for provider review:    None         Lizy Medved  Chart routed to None.